# Patient Record
Sex: FEMALE | Race: WHITE | NOT HISPANIC OR LATINO | Employment: OTHER | URBAN - NONMETROPOLITAN AREA
[De-identification: names, ages, dates, MRNs, and addresses within clinical notes are randomized per-mention and may not be internally consistent; named-entity substitution may affect disease eponyms.]

---

## 2017-01-03 ENCOUNTER — OUTSIDE FACILITY SERVICE (OUTPATIENT)
Dept: CARDIOLOGY | Facility: CLINIC | Age: 52
End: 2017-01-03

## 2017-01-03 PROCEDURE — 99222 1ST HOSP IP/OBS MODERATE 55: CPT | Performed by: NURSE PRACTITIONER

## 2017-01-04 ENCOUNTER — OUTSIDE FACILITY SERVICE (OUTPATIENT)
Dept: CARDIOLOGY | Facility: CLINIC | Age: 52
End: 2017-01-04

## 2017-01-04 PROCEDURE — 99231 SBSQ HOSP IP/OBS SF/LOW 25: CPT | Performed by: NURSE PRACTITIONER

## 2017-01-05 ENCOUNTER — OUTSIDE FACILITY SERVICE (OUTPATIENT)
Dept: CARDIOLOGY | Facility: CLINIC | Age: 52
End: 2017-01-05

## 2017-01-05 PROCEDURE — 99231 SBSQ HOSP IP/OBS SF/LOW 25: CPT | Performed by: NURSE PRACTITIONER

## 2017-01-24 ENCOUNTER — OFFICE VISIT (OUTPATIENT)
Dept: CARDIOLOGY | Facility: CLINIC | Age: 52
End: 2017-01-24

## 2017-01-24 VITALS
HEART RATE: 82 BPM | BODY MASS INDEX: 13.59 KG/M2 | OXYGEN SATURATION: 98 % | DIASTOLIC BLOOD PRESSURE: 60 MMHG | SYSTOLIC BLOOD PRESSURE: 100 MMHG | WEIGHT: 72 LBS | HEIGHT: 61 IN

## 2017-01-24 DIAGNOSIS — R00.2 PALPITATIONS: ICD-10-CM

## 2017-01-24 DIAGNOSIS — I25.119 CORONARY ARTERY DISEASE INVOLVING NATIVE CORONARY ARTERY OF NATIVE HEART WITH ANGINA PECTORIS (HCC): ICD-10-CM

## 2017-01-24 DIAGNOSIS — I73.9 CLAUDICATION (HCC): ICD-10-CM

## 2017-01-24 DIAGNOSIS — Z72.0 TOBACCO ABUSE: ICD-10-CM

## 2017-01-24 DIAGNOSIS — R06.02 SHORTNESS OF BREATH: Primary | ICD-10-CM

## 2017-01-24 PROCEDURE — 99214 OFFICE O/P EST MOD 30 MIN: CPT | Performed by: INTERNAL MEDICINE

## 2017-01-24 RX ORDER — ALBUTEROL SULFATE 2.5 MG/3ML
2.5 SOLUTION RESPIRATORY (INHALATION) EVERY 6 HOURS PRN
COMMUNITY
End: 2021-04-28

## 2017-01-24 RX ORDER — FLUCONAZOLE 200 MG/1
200 TABLET ORAL DAILY
COMMUNITY
End: 2018-10-31

## 2017-01-24 RX ORDER — FUROSEMIDE 40 MG/1
40 TABLET ORAL DAILY
Refills: 0 | COMMUNITY
Start: 2017-01-06 | End: 2018-10-31

## 2017-01-24 RX ORDER — NITROGLYCERIN 0.4 MG/1
0.4 TABLET SUBLINGUAL
Qty: 25 TABLET | Refills: 5 | Status: SHIPPED | OUTPATIENT
Start: 2017-01-24 | End: 2018-10-31 | Stop reason: SDUPTHER

## 2017-01-24 NOTE — MR AVS SNAPSHOT
Rin Soler   1/24/2017 2:45 PM   Office Visit    Dept Phone:  [phone]   Encounter #:  [id number]    Provider:  Mike Cunningham MD   Department:  [facility]                Your Full Care Plan              Today's Medication Changes          These changes are accurate as of: 1/24/17  3:45 PM.  If you have any questions, ask your nurse or doctor.               Stop taking medication(s)listed here:     lisinopril 10 MG tablet   Commonly known as:  PRINIVIL,ZESTRIL   Stopped by:  Mike Cunningham MD           lisinopril 40 MG tablet   Commonly known as:  PRINIVIL,ZESTRIL   Stopped by:  Mike Cunningham MD           sucralfate 1 G tablet   Commonly known as:  CARAFATE   Stopped by:  Mike Cunningham MD           ticagrelor 90 MG tablet tablet   Commonly known as:  BRILINTA   Stopped by:  Mike Cunningham MD                Where to Get Your Medications      These medications were sent to [facility] [address] [place] [address] AT [facility] [address] - [phone] [place] [phone] [address] [address]    Hours:  24-hours Phone:  [phone]     nitroglycerin 0.4 MG SL tablet                  Your Updated Medication List          This list is accurate as of: 1/24/17  3:45 PM.  Always use your most recent med list.                albuterol (2.5 MG/3ML) 0.083% nebulizer solution   Commonly known as:  PROVENTIL       aspirin 81 MG EC tablet       atorvastatin 20 MG tablet   Commonly known as:  LIPITOR   Take 1 tablet by mouth daily.       diazePAM 10 MG tablet   Commonly known as:  VALIUM       famotidine 40 MG tablet   Commonly known as:  PEPCID       fluconazole 200 MG tablet   Commonly known as:  DIFLUCAN       furosemide 40 MG tablet   Commonly known as:  LASIX       HYDROcodone-acetaminophen 7.5-325 MG per tablet   Commonly known as:  NORCO       isosorbide mononitrate 30 MG 24 hr tablet   Commonly known as:  IMDUR   Take 1  "tablet by mouth daily.       metoprolol tartrate 25 MG tablet   Commonly known as:  LOPRESSOR       nitroglycerin 0.4 MG SL tablet   Commonly known as:  NITROSTAT   Place 1 tablet under the tongue Every 5 (Five) Minutes As Needed for chest pain. Take no more than 3 doses in 15 minutes.       pantoprazole 40 MG EC tablet   Commonly known as:  PROTONIX               You Were Diagnosed With        Codes Comments    Shortness of breath    -  Primary ICD-10-CM: R06.02  ICD-9-CM: 786.05     Coronary artery disease involving native coronary artery of native heart with angina pectoris     ICD-10-CM: I25.119  ICD-9-CM: 414.01, 413.9     Claudication     ICD-10-CM: I73.9  ICD-9-CM: 443.9     Palpitations     ICD-10-CM: R00.2  ICD-9-CM: 785.1     Tobacco abuse     ICD-10-CM: Z72.0  ICD-9-CM: 305.1       Instructions     None    Patient Instructions History      Upcoming Appointments     Visit Type Date Time Department    FOLLOW UP 1/24/2017  2:45 PM [facility]    FOLLOW UP 7/27/2017  2:00 PM [facility] Signup     Our records indicate that you have declined [employer] signup. If you would like to sign up for [employer], please email [email or web address] or call [phone] to obtain an activation code.             Other Info from Your Visit           Your Appointments     Jul 27, 2017  2:00 PM CDT   Follow Up with Mike Cunningham MD   [facility] (--)    [address] [place] [id number] [phone]           Arrive 15 minutes prior to appointment.              Allergies     Plavix [Clopidogrel Bisulfate]        Reason for Visit     Coronary Artery Disease           Vital Signs     Blood Pressure Pulse Height Weight Oxygen Saturation Body Mass Index    100/60 (BP Location: Left arm, Patient Position: Sitting, Cuff Size: Adult) 82 61\" (154.9 cm) 72 lb (32.7 kg) 98% 13.6 kg/m2    Smoking Status                   Current Every Day " Smoker           Problems and Diagnoses Noted     Coronary artery disease involving native coronary artery of native heart with angina pectoris    Tobacco abuse    Shortness of breath    -  Primary    Claudication        Palpitations

## 2017-01-24 NOTE — LETTER
January 24, 2017     Milton Mcfadden MD  [address]  [address]  [place] [address]    Patient: Rin Soler   YOB: 1965   Date of Visit: 1/24/2017       Dear Dr. Lesa MD:    Rin Soler was in my office today. Below is a copy of my note.    If you have questions, please do not hesitate to call me. I look forward to following Rin along with you.         Sincerely,        Mike Cunningham MD        CC: No Recipients      Reason for Visit: cardiovascular follow up.    HPI:  Rin Soler is a 52 y.o. female is here today for follow-up.  She was last seen in December for evaluation of shortness of breath.  She was hospitalized from 12/31/16 until 1/6/16 with anemia, pneumonia, paraneumonic effusion, multiple electrolyte abnormalities.  Her aspirin, Brilinta, and atorvastatin have been held.  Had endoscopy and esophageal stretching while inpatient.  She has been coughing a lot since discharge.  Shortness of breath remains stable.  Continues to have atypical chest pain.      Previous Cardiac Testing and Procedures:  - Stress echo (08/04/2015) negative for ischemia  - LHC (10/05/2015) 90% mid RCA stenosis status post PCI with 2.25 x 12 mm Xience Alpine IAN, no other coronary artery disease, EF 55% with mild inferior hypokinesis  - Stress echo (03/29/2016) low risk for ischemia, good exercise capacity  - Echo (12/22/2016) EF 60-65%, normal RV size and function, normal diastolic function, all valves structurally and functionally normal  - VESTA (12/22/2016) normal arterial exam of the lower extremities  - Holter monitor (12/22/2016) sinus rhythm with rare atrial ventricular ectopic beats, no significant positives, arrhythmias, or events, symptoms associated with sinus tachycardia    Patient Active Problem List   Diagnosis   • Dysphagia   • GERD (gastroesophageal reflux disease)   • Tobacco abuse   • Coronary artery disease involving native coronary artery of native heart with angina pectoris              Social History   Substance Use Topics   • Smoking status: Current Every Day Smoker     Packs/day: 1.00     Years: 20.00     Types: Cigarettes     Start date: 1976   • Smokeless tobacco: Never Used   • Alcohol use No       Family History   Problem Relation Age of Onset   • Diabetes Mother    • Cancer Father    • Heart disease Father        The following portions of the patient's history were reviewed and updated as appropriate: allergies, current medications, past family history, past medical history, past social history, past surgical history and problem list.      Current Outpatient Prescriptions:   •  albuterol (PROVENTIL) (2.5 MG/3ML) 0.083% nebulizer solution, Take 2.5 mg by nebulization Every 6 (Six) Hours As Needed for wheezing (2 puffs)., Disp: , Rfl:   •  diazePAM (VALIUM) 10 MG tablet, TK 1 T PO TID PRN, Disp: , Rfl: 1  •  fluconazole (DIFLUCAN) 200 MG tablet, Take 200 mg by mouth Daily., Disp: , Rfl:   •  furosemide (LASIX) 40 MG tablet, 40 mg Daily., Disp: , Rfl: 0  •  HYDROcodone-acetaminophen (NORCO) 7.5-325 MG per tablet, TK 1 T PO Q 6 H, Disp: , Rfl: 0  •  isosorbide mononitrate (IMDUR) 30 MG 24 hr tablet, Take 1 tablet by mouth daily., Disp: 30 tablet, Rfl: 11  •  metoprolol tartrate (LOPRESSOR) 25 MG tablet, TK 1 T PO BID, Disp: , Rfl: 11  •  nitroglycerin (NITROSTAT) 0.4 MG SL tablet, Place 1 tablet under the tongue every 5 (five) minutes as needed for chest pain. Take no more than 3 doses in 15 minutes., Disp: 25 tablet, Rfl: 5  •  pantoprazole (PROTONIX) 40 MG EC tablet, TK 1 T PO BID, Disp: , Rfl: 11  •  aspirin 81 MG EC tablet, Take 81 mg by mouth Daily., Disp: , Rfl:   •  atorvastatin (LIPITOR) 20 MG tablet, Take 1 tablet by mouth daily., Disp: 30 tablet, Rfl: 11  •  famotidine (PEPCID) 40 MG tablet, TK 1 T PO QHS, Disp: , Rfl: 11    Review of Systems   Constitution: Positive for weakness and malaise/fatigue. Negative for chills, decreased appetite and fever.   HENT: Positive for  "headaches. Negative for congestion and nosebleeds.    Eyes: Positive for blurred vision. Negative for double vision.   Cardiovascular: Positive for chest pain, dyspnea on exertion, irregular heartbeat and palpitations. Negative for leg swelling, near-syncope and syncope.   Respiratory: Positive for cough, shortness of breath and sleep disturbances due to breathing. Negative for wheezing.    Endocrine: Positive for cold intolerance and heat intolerance.   Hematologic/Lymphatic: Negative for bleeding problem. Does not bruise/bleed easily.   Skin: Positive for itching and rash.   Musculoskeletal: Positive for arthritis, back pain, falls, joint pain, joint swelling, muscle cramps, muscle weakness, neck pain and stiffness.   Gastrointestinal: Positive for nausea and vomiting. Negative for abdominal pain, constipation, diarrhea, heartburn and melena.   Genitourinary: Negative for dysuria, frequency and hematuria.   Neurological: Positive for tremors. Negative for dizziness, light-headedness, loss of balance and numbness.   Psychiatric/Behavioral: Positive for depression. Negative for hallucinations. The patient has insomnia and is nervous/anxious.    Allergic/Immunologic: Positive for persistent infections.       Objective   Visit Vitals   • /60 (BP Location: Left arm, Patient Position: Sitting, Cuff Size: Adult)   • Pulse 82   • Ht 61\" (154.9 cm)   • Wt 72 lb (32.7 kg)   • SpO2 98%   • BMI 13.6 kg/m2     Physical Exam   Constitutional: She is oriented to person, place, and time. She appears cachectic.   HENT:   Head: Normocephalic and atraumatic.   Cardiovascular: Normal rate, regular rhythm and normal heart sounds.    No murmur heard.  Pulmonary/Chest: Effort normal and breath sounds normal.   Musculoskeletal: She exhibits no edema.   Neurological: She is alert and oriented to person, place, and time.   Skin: Skin is warm and dry.   Psychiatric: She has a normal mood and affect.     Procedures      ICD-10-CM " ICD-9-CM   1. Shortness of breath R06.02 786.05   2. Coronary artery disease involving native coronary artery of native heart with angina pectoris I25.119 414.01     413.9   3. Claudication I73.9 443.9   4. Palpitations R00.2 785.1   5. Tobacco abuse Z72.0 305.1         Assessment/Plan:  1.  Shortness of breath: Normal echo and stress echo.  No evidence of cardiac etiology to symptoms.  Possibly due to anemia, recent pneumonia, COPD, malnutrition, and deconditioning.    2.  CAD: Status post PCI to RCA October 2015.  Negative stress echo March 2016.  Continue current medical therapy with aspirin, Brilinta, metoprolol, and atorvastatin.  Brilinta, aspirin, and atorvastatin has been held since discharge.  Brilinta can be stopped since it is greater than 1 year since stent.  She should resume her aspirin and atorvastatin.      3.  Claudication: Normal VESTA exam.  Possibly musculoskeletal etiology.    4.  Palpitations: Symptoms associated with sinus tachycardia, otherwise unremarkable Holter monitor.  No evidence of dysrhythmia.    5.  Tobacco abuse: smokes 1 PPD,  on cessation.

## 2017-01-24 NOTE — PROGRESS NOTES
Reason for Visit: cardiovascular follow up.    HPI:  Rin Soler is a 52 y.o. female is here today for follow-up.  She was last seen in December for evaluation of shortness of breath.  She was hospitalized from 12/31/16 until 1/6/16 with anemia, pneumonia, paraneumonic effusion, multiple electrolyte abnormalities.  Her aspirin, Brilinta, and atorvastatin have been held.  Had endoscopy and esophageal stretching while inpatient.  She has been coughing a lot since discharge.  Shortness of breath remains stable.  Continues to have atypical chest pain.      Previous Cardiac Testing and Procedures:  - Stress echo (08/04/2015) negative for ischemia  - LHC (10/05/2015) 90% mid RCA stenosis status post PCI with 2.25 x 12 mm Xience Alpine IAN, no other coronary artery disease, EF 55% with mild inferior hypokinesis  - Stress echo (03/29/2016) low risk for ischemia, good exercise capacity  - Echo (12/22/2016) EF 60-65%, normal RV size and function, normal diastolic function, all valves structurally and functionally normal  - VESTA (12/22/2016) normal arterial exam of the lower extremities  - Holter monitor (12/22/2016) sinus rhythm with rare atrial ventricular ectopic beats, no significant positives, arrhythmias, or events, symptoms associated with sinus tachycardia    Patient Active Problem List   Diagnosis   • Dysphagia   • GERD (gastroesophageal reflux disease)   • Tobacco abuse   • Coronary artery disease involving native coronary artery of native heart with angina pectoris       Social History   Substance Use Topics   • Smoking status: Current Every Day Smoker     Packs/day: 1.00     Years: 20.00     Types: Cigarettes     Start date: 1976   • Smokeless tobacco: Never Used   • Alcohol use No       Family History   Problem Relation Age of Onset   • Diabetes Mother    • Cancer Father    • Heart disease Father        The following portions of the patient's history were reviewed and updated as appropriate: allergies, current  medications, past family history, past medical history, past social history, past surgical history and problem list.      Current Outpatient Prescriptions:   •  albuterol (PROVENTIL) (2.5 MG/3ML) 0.083% nebulizer solution, Take 2.5 mg by nebulization Every 6 (Six) Hours As Needed for wheezing (2 puffs)., Disp: , Rfl:   •  diazePAM (VALIUM) 10 MG tablet, TK 1 T PO TID PRN, Disp: , Rfl: 1  •  fluconazole (DIFLUCAN) 200 MG tablet, Take 200 mg by mouth Daily., Disp: , Rfl:   •  furosemide (LASIX) 40 MG tablet, 40 mg Daily., Disp: , Rfl: 0  •  HYDROcodone-acetaminophen (NORCO) 7.5-325 MG per tablet, TK 1 T PO Q 6 H, Disp: , Rfl: 0  •  isosorbide mononitrate (IMDUR) 30 MG 24 hr tablet, Take 1 tablet by mouth daily., Disp: 30 tablet, Rfl: 11  •  metoprolol tartrate (LOPRESSOR) 25 MG tablet, TK 1 T PO BID, Disp: , Rfl: 11  •  nitroglycerin (NITROSTAT) 0.4 MG SL tablet, Place 1 tablet under the tongue every 5 (five) minutes as needed for chest pain. Take no more than 3 doses in 15 minutes., Disp: 25 tablet, Rfl: 5  •  pantoprazole (PROTONIX) 40 MG EC tablet, TK 1 T PO BID, Disp: , Rfl: 11  •  aspirin 81 MG EC tablet, Take 81 mg by mouth Daily., Disp: , Rfl:   •  atorvastatin (LIPITOR) 20 MG tablet, Take 1 tablet by mouth daily., Disp: 30 tablet, Rfl: 11  •  famotidine (PEPCID) 40 MG tablet, TK 1 T PO QHS, Disp: , Rfl: 11    Review of Systems   Constitution: Positive for weakness and malaise/fatigue. Negative for chills, decreased appetite and fever.   HENT: Positive for headaches. Negative for congestion and nosebleeds.    Eyes: Positive for blurred vision. Negative for double vision.   Cardiovascular: Positive for chest pain, dyspnea on exertion, irregular heartbeat and palpitations. Negative for leg swelling, near-syncope and syncope.   Respiratory: Positive for cough, shortness of breath and sleep disturbances due to breathing. Negative for wheezing.    Endocrine: Positive for cold intolerance and heat intolerance.  "  Hematologic/Lymphatic: Negative for bleeding problem. Does not bruise/bleed easily.   Skin: Positive for itching and rash.   Musculoskeletal: Positive for arthritis, back pain, falls, joint pain, joint swelling, muscle cramps, muscle weakness, neck pain and stiffness.   Gastrointestinal: Positive for nausea and vomiting. Negative for abdominal pain, constipation, diarrhea, heartburn and melena.   Genitourinary: Negative for dysuria, frequency and hematuria.   Neurological: Positive for tremors. Negative for dizziness, light-headedness, loss of balance and numbness.   Psychiatric/Behavioral: Positive for depression. Negative for hallucinations. The patient has insomnia and is nervous/anxious.    Allergic/Immunologic: Positive for persistent infections.       Objective   Visit Vitals   • /60 (BP Location: Left arm, Patient Position: Sitting, Cuff Size: Adult)   • Pulse 82   • Ht 61\" (154.9 cm)   • Wt 72 lb (32.7 kg)   • SpO2 98%   • BMI 13.6 kg/m2     Physical Exam   Constitutional: She is oriented to person, place, and time. She appears cachectic.   HENT:   Head: Normocephalic and atraumatic.   Cardiovascular: Normal rate, regular rhythm and normal heart sounds.    No murmur heard.  Pulmonary/Chest: Effort normal and breath sounds normal.   Musculoskeletal: She exhibits no edema.   Neurological: She is alert and oriented to person, place, and time.   Skin: Skin is warm and dry.   Psychiatric: She has a normal mood and affect.     Procedures      ICD-10-CM ICD-9-CM   1. Shortness of breath R06.02 786.05   2. Coronary artery disease involving native coronary artery of native heart with angina pectoris I25.119 414.01     413.9   3. Claudication I73.9 443.9   4. Palpitations R00.2 785.1   5. Tobacco abuse Z72.0 305.1         Assessment/Plan:  1.  Shortness of breath: Normal echo and stress echo.  No evidence of cardiac etiology to symptoms.  Possibly due to anemia, recent pneumonia, COPD, malnutrition, and " deconditioning.    2.  CAD: Status post PCI to RCA October 2015.  Negative stress echo March 2016.  Continue current medical therapy with aspirin, Brilinta, metoprolol, and atorvastatin.  Brilinta, aspirin, and atorvastatin has been held since discharge.  Brilinta can be stopped since it is greater than 1 year since stent.  She should resume her aspirin and atorvastatin.      3.  Claudication: Normal VESTA exam.  Possibly musculoskeletal etiology.    4.  Palpitations: Symptoms associated with sinus tachycardia, otherwise unremarkable Holter monitor.  No evidence of dysrhythmia.    5.  Tobacco abuse: smokes 1 PPD,  on cessation.

## 2017-04-19 ENCOUNTER — TELEPHONE (OUTPATIENT)
Dept: CARDIOLOGY | Facility: CLINIC | Age: 52
End: 2017-04-19

## 2017-04-19 NOTE — TELEPHONE ENCOUNTER
She is scheduled to have colonoscopy with Dr. Buckley in [place] next week. They want to know if she needs to stop asa and for how long?

## 2017-09-25 RX ORDER — ISOSORBIDE MONONITRATE 30 MG/1
TABLET, EXTENDED RELEASE ORAL
Qty: 30 TABLET | Refills: 11 | Status: SHIPPED | OUTPATIENT
Start: 2017-09-25 | End: 2018-09-20 | Stop reason: SDUPTHER

## 2017-09-25 RX ORDER — ATORVASTATIN CALCIUM 20 MG/1
TABLET, FILM COATED ORAL
Qty: 30 TABLET | Refills: 11 | Status: SHIPPED | OUTPATIENT
Start: 2017-09-25 | End: 2018-09-20 | Stop reason: SDUPTHER

## 2018-09-20 RX ORDER — ISOSORBIDE MONONITRATE 30 MG/1
30 TABLET, EXTENDED RELEASE ORAL DAILY
Qty: 30 TABLET | Refills: 0 | Status: SHIPPED | OUTPATIENT
Start: 2018-09-20 | End: 2018-10-18 | Stop reason: SDUPTHER

## 2018-09-20 RX ORDER — ATORVASTATIN CALCIUM 20 MG/1
20 TABLET, FILM COATED ORAL DAILY
Qty: 30 TABLET | Refills: 0 | Status: SHIPPED | OUTPATIENT
Start: 2018-09-20 | End: 2018-10-18 | Stop reason: SDUPTHER

## 2018-10-18 RX ORDER — ISOSORBIDE MONONITRATE 30 MG/1
30 TABLET, EXTENDED RELEASE ORAL DAILY
Qty: 30 TABLET | Refills: 0 | Status: SHIPPED | OUTPATIENT
Start: 2018-10-18 | End: 2018-10-31 | Stop reason: SDUPTHER

## 2018-10-18 RX ORDER — ATORVASTATIN CALCIUM 20 MG/1
20 TABLET, FILM COATED ORAL DAILY
Qty: 30 TABLET | Refills: 0 | Status: SHIPPED | OUTPATIENT
Start: 2018-10-18 | End: 2018-10-31 | Stop reason: SDUPTHER

## 2018-10-18 NOTE — TELEPHONE ENCOUNTER
Refill request for:  Atorvastatin 20 mg PO daily, Quantity: 30, Refills: 0.  Imdur 30 mg PO daily, Quantity: 30, Refills: 0.   HL

## 2018-10-31 ENCOUNTER — OFFICE VISIT (OUTPATIENT)
Dept: CARDIOLOGY | Facility: CLINIC | Age: 53
End: 2018-10-31

## 2018-10-31 VITALS
SYSTOLIC BLOOD PRESSURE: 162 MMHG | WEIGHT: 68 LBS | DIASTOLIC BLOOD PRESSURE: 90 MMHG | OXYGEN SATURATION: 99 % | BODY MASS INDEX: 12.84 KG/M2 | HEART RATE: 59 BPM | HEIGHT: 61 IN

## 2018-10-31 DIAGNOSIS — E78.5 DYSLIPIDEMIA: ICD-10-CM

## 2018-10-31 DIAGNOSIS — Z72.0 TOBACCO ABUSE: ICD-10-CM

## 2018-10-31 DIAGNOSIS — I25.119 CORONARY ARTERY DISEASE INVOLVING NATIVE CORONARY ARTERY OF NATIVE HEART WITH ANGINA PECTORIS (HCC): Primary | ICD-10-CM

## 2018-10-31 DIAGNOSIS — R07.9 CHEST PAIN, UNSPECIFIED TYPE: ICD-10-CM

## 2018-10-31 PROCEDURE — 93000 ELECTROCARDIOGRAM COMPLETE: CPT | Performed by: INTERNAL MEDICINE

## 2018-10-31 PROCEDURE — 99406 BEHAV CHNG SMOKING 3-10 MIN: CPT | Performed by: INTERNAL MEDICINE

## 2018-10-31 PROCEDURE — 99214 OFFICE O/P EST MOD 30 MIN: CPT | Performed by: INTERNAL MEDICINE

## 2018-10-31 RX ORDER — ATORVASTATIN CALCIUM 20 MG/1
20 TABLET, FILM COATED ORAL DAILY
Qty: 30 TABLET | Refills: 0 | Status: SHIPPED | OUTPATIENT
Start: 2018-10-31 | End: 2018-11-14 | Stop reason: SDUPTHER

## 2018-10-31 RX ORDER — NITROGLYCERIN 0.4 MG/1
0.4 TABLET SUBLINGUAL
Qty: 25 TABLET | Refills: 5 | Status: SHIPPED | OUTPATIENT
Start: 2018-10-31

## 2018-10-31 RX ORDER — NICOTINE 21 MG/24HR
1 PATCH, TRANSDERMAL 24 HOURS TRANSDERMAL EVERY 24 HOURS
Qty: 30 PATCH | Refills: 11 | Status: SHIPPED | OUTPATIENT
Start: 2018-10-31 | End: 2020-06-10

## 2018-10-31 RX ORDER — ISOSORBIDE MONONITRATE 60 MG/1
60 TABLET, EXTENDED RELEASE ORAL DAILY
Qty: 30 TABLET | Refills: 11 | Status: SHIPPED | OUTPATIENT
Start: 2018-10-31 | End: 2018-11-27 | Stop reason: SDUPTHER

## 2018-10-31 NOTE — PROGRESS NOTES
Reason for Visit: cardiovascular follow up.    HPI:  Rin Soler is a 53 y.o. female is here today for 1 year follow-up.  She has been having worsening chest pain over the past couple weeks.  It happens at both rest and stress.  Described as a substernal chest pressure sometimes radiating into her arms.  Usually improves with rest and relaxation.  She has not taken any nitroglycerin because she is concerned about possible side effects, does not like taking medications, and her bottle has  at home.  She continues to smoke 1 PPD.  She uses this as a stress relief.  Her blood pressure has been running high recently.      Previous Cardiac Testing and Procedures:  - Stress echo (2015) negative for ischemia  - Lipid panel (2015) 166/47/93/130  - LHC (10/05/2015) 90% mid RCA stenosis status post PCI with 2.25 x 12 mm Xience Alpine IAN, no other coronary artery disease, EF 55% with mild inferior hypokinesis  - Stress echo (2016) low risk for ischemia, good exercise capacity  - Echo (2016) EF 60-65%, normal RV size and function, normal diastolic function, all valves structurally and functionally normal  - VESTA (2016) normal arterial exam of the lower extremities  - Holter monitor (2016) sinus rhythm with rare atrial ventricular ectopic beats, no significant pauses, arrhythmias, or events, symptoms associated with sinus tachycardia    Patient Active Problem List   Diagnosis   • Dysphagia   • GERD (gastroesophageal reflux disease)   • Tobacco abuse   • Coronary artery disease involving native coronary artery of native heart with angina pectoris (CMS/[facility])       Social History   Substance Use Topics   • Smoking status: Current Every Day Smoker     Packs/day: 1.00     Years: 20.00     Types: Cigarettes     Start date:    • Smokeless tobacco: Never Used   • Alcohol use No       Family History   Problem Relation Age of Onset   • Diabetes Mother    • Cancer Father    • Heart disease  Father        The following portions of the patient's history were reviewed and updated as appropriate: allergies, current medications, past family history, past medical history, past social history, past surgical history and problem list.      Current Outpatient Prescriptions:   •  albuterol (PROVENTIL) (2.5 MG/3ML) 0.083% nebulizer solution, Take 2.5 mg by nebulization Every 6 (Six) Hours As Needed for wheezing (2 puffs)., Disp: , Rfl:   •  aspirin 81 MG EC tablet, Take 81 mg by mouth Daily., Disp: , Rfl:   •  atorvastatin (LIPITOR) 20 MG tablet, Take 1 tablet by mouth Daily., Disp: 30 tablet, Rfl: 0  •  diazePAM (VALIUM) 10 MG tablet, TK 1 T PO TID PRN, Disp: , Rfl: 1  •  HYDROcodone-acetaminophen (NORCO) 7.5-325 MG per tablet, TK 1 T PO Q 6 H, Disp: , Rfl: 0  •  isosorbide mononitrate (IMDUR) 60 MG 24 hr tablet, Take 1 tablet by mouth Daily., Disp: 30 tablet, Rfl: 11  •  metoprolol tartrate (LOPRESSOR) 25 MG tablet, Take 1 tablet by mouth 2 (Two) Times a Day., Disp: 60 tablet, Rfl: 11  •  pantoprazole (PROTONIX) 40 MG EC tablet, TK 1 T PO BID, Disp: , Rfl: 11  •  nicotine (NICODERM CQ) 21 MG/24HR patch, Place 1 patch on the skin as directed by provider Daily., Disp: 30 patch, Rfl: 11  •  nicotine polacrilex (NICORETTE) 4 MG gum, Chew 1 each As Needed for Smoking Cessation., Disp: 150 each, Rfl: 11  •  nitroglycerin (NITROSTAT) 0.4 MG SL tablet, Place 1 tablet under the tongue Every 5 (Five) Minutes As Needed for Chest Pain. Take no more than 3 doses in 15 minutes., Disp: 25 tablet, Rfl: 5    Review of Systems   Constitution: Negative for chills and fever.   Cardiovascular: Positive for chest pain. Negative for paroxysmal nocturnal dyspnea.   Respiratory: Positive for shortness of breath. Negative for cough.    Skin: Negative for rash.   Gastrointestinal: Negative for abdominal pain and heartburn.   Neurological: Negative for dizziness and numbness.   Psychiatric/Behavioral: The patient is nervous/anxious.   "      Objective   /90 (BP Location: Left arm, Patient Position: Sitting, Cuff Size: Adult)   Pulse 59   Ht 154.9 cm (60.98\")   Wt 30.8 kg (68 lb)   SpO2 99%   BMI 12.86 kg/m²   Physical Exam   Constitutional: She is oriented to person, place, and time. She appears cachectic.   HENT:   Head: Normocephalic and atraumatic.   Cardiovascular: Normal rate, regular rhythm and normal heart sounds.    No murmur heard.  Pulmonary/Chest: Effort normal and breath sounds normal.   Abdominal: She exhibits no distension.   Musculoskeletal: She exhibits no edema.   Neurological: She is alert and oriented to person, place, and time.   Skin: Skin is warm and dry.   Psychiatric: She has a normal mood and affect.       ECG 12 Lead  Date/Time: 10/31/2018 3:08 PM  Performed by: BROOKLYN BUENROSTRO  Authorized by: BROOKLYN BUENROSTRO   Comparison: compared with previous ECG from 12/7/2016  Similar to previous ECG  Comparison to previous ECG: Significant changes have occurred  Rhythm: sinus rhythm  Rate: bradycardic  T depression: V6, V5, V4, V3, III, aVF and II  Other findings: LVH              ICD-10-CM ICD-9-CM   1. Coronary artery disease involving native coronary artery of native heart with angina pectoris (CMS/[facility]) I25.119 414.01     413.9   2. Chest pain, unspecified type R07.9 786.50   3. Tobacco abuse Z72.0 305.1   4. Dyslipidemia E78.5 272.4         Assessment/Plan:  1. CAD: Status post PCI to RCA 10/2015.  Negative stress echo March 2016.  Now with worsening chest pain and new EKG changes.  Will evaluate further with a nuclear stress.  Titrate up Imdur to 60 mg, renew nitroglycerin.  Continue other medical therapy with aspirin, Brilinta, metoprolol, and atorvastatin.  Aspirin and atorvastatin has been held since discharge.      2.  Chest pain: Concerning for progression of her CAD.  Nuclear stress ordered.     3.  Tobacco abuse:  on smoking cessation.  I advised Rin of the risks of continuing to use tobacco, and I " provided her with tobacco cessation educational materials in the After Visit Summary.  During this visit, I spent 10 minutes counseling the patient regarding tobacco cessation.     4.  Dyslipidemia: Currently managed on atorvastatin.  Will obtain copy of last lipid panel from PCP.

## 2018-11-07 ENCOUNTER — TELEPHONE (OUTPATIENT)
Dept: CARDIOLOGY | Facility: CLINIC | Age: 53
End: 2018-11-07

## 2018-11-07 NOTE — TELEPHONE ENCOUNTER
----- Message from Mike Cunningham MD sent at 10/31/2018  3:41 PM CDT -----  Please obtain copy of last lipid panel from PCP

## 2018-11-14 DIAGNOSIS — E78.5 DYSLIPIDEMIA: ICD-10-CM

## 2018-11-14 RX ORDER — ATORVASTATIN CALCIUM 20 MG/1
20 TABLET, FILM COATED ORAL DAILY
Qty: 30 TABLET | Refills: 11 | Status: SHIPPED | OUTPATIENT
Start: 2018-11-14 | End: 2019-12-19 | Stop reason: SDUPTHER

## 2018-11-20 RX ORDER — ISOSORBIDE MONONITRATE 30 MG/1
TABLET, EXTENDED RELEASE ORAL
Qty: 30 TABLET | Refills: 0 | OUTPATIENT
Start: 2018-11-20

## 2018-11-26 ENCOUNTER — TELEPHONE (OUTPATIENT)
Dept: CARDIOLOGY | Facility: CLINIC | Age: 53
End: 2018-11-26

## 2018-11-27 DIAGNOSIS — I25.119 CORONARY ARTERY DISEASE INVOLVING NATIVE CORONARY ARTERY OF NATIVE HEART WITH ANGINA PECTORIS (HCC): ICD-10-CM

## 2018-11-27 RX ORDER — ISOSORBIDE MONONITRATE 30 MG/1
30 TABLET, EXTENDED RELEASE ORAL DAILY
Qty: 30 TABLET | Refills: 11 | Status: SHIPPED | OUTPATIENT
Start: 2018-11-27 | End: 2019-12-19 | Stop reason: SDUPTHER

## 2019-02-08 ENCOUNTER — TELEPHONE (OUTPATIENT)
Dept: CARDIOLOGY | Facility: CLINIC | Age: 54
End: 2019-02-08

## 2019-02-08 DIAGNOSIS — R07.9 CHEST PAIN, UNSPECIFIED TYPE: Primary | ICD-10-CM

## 2019-02-08 NOTE — TELEPHONE ENCOUNTER
Pt called requesting stress test be rescheduled that was ordered in Oct 2018.  She does not want IV medication. Can order be changed from nuclear to treadmill?

## 2019-02-25 NOTE — TELEPHONE ENCOUNTER
Pt called back bc she said she had not heard anything about a stress test scheduled - I notified her that I left the appt on her  bc she did not answer when I called to notify her.  I told her that we asked Dr. Cunningham about changing the nuclear stress order and that he did not see fit to changing the order.  She wasn't happy about it but said if that's what she has to do then she will do it.  Notified pt of all prep and where to go for the test again, pt understood.

## 2019-03-13 ENCOUNTER — OUTSIDE FACILITY SERVICE (OUTPATIENT)
Dept: CARDIOLOGY | Facility: CLINIC | Age: 54
End: 2019-03-13

## 2019-03-13 PROCEDURE — 93018 CV STRESS TEST I&R ONLY: CPT | Performed by: INTERNAL MEDICINE

## 2019-03-13 PROCEDURE — 93016 CV STRESS TEST SUPVJ ONLY: CPT | Performed by: INTERNAL MEDICINE

## 2019-03-13 PROCEDURE — 78452 HT MUSCLE IMAGE SPECT MULT: CPT | Performed by: INTERNAL MEDICINE

## 2019-04-18 ENCOUNTER — OFFICE VISIT (OUTPATIENT)
Dept: CARDIOLOGY | Facility: CLINIC | Age: 54
End: 2019-04-18

## 2019-04-18 VITALS
HEART RATE: 62 BPM | HEIGHT: 61 IN | SYSTOLIC BLOOD PRESSURE: 110 MMHG | BODY MASS INDEX: 12.84 KG/M2 | DIASTOLIC BLOOD PRESSURE: 80 MMHG | OXYGEN SATURATION: 98 % | WEIGHT: 68 LBS

## 2019-04-18 DIAGNOSIS — E78.5 DYSLIPIDEMIA: ICD-10-CM

## 2019-04-18 DIAGNOSIS — R07.89 OTHER CHEST PAIN: ICD-10-CM

## 2019-04-18 DIAGNOSIS — Z72.0 TOBACCO ABUSE: ICD-10-CM

## 2019-04-18 DIAGNOSIS — Z01.818 PREOPERATIVE EVALUATION TO RULE OUT SURGICAL CONTRAINDICATION: ICD-10-CM

## 2019-04-18 DIAGNOSIS — I25.119 CORONARY ARTERY DISEASE INVOLVING NATIVE CORONARY ARTERY OF NATIVE HEART WITH ANGINA PECTORIS (HCC): Primary | ICD-10-CM

## 2019-04-18 PROCEDURE — 99214 OFFICE O/P EST MOD 30 MIN: CPT | Performed by: INTERNAL MEDICINE

## 2019-04-18 PROCEDURE — 99406 BEHAV CHNG SMOKING 3-10 MIN: CPT | Performed by: INTERNAL MEDICINE

## 2019-04-18 PROCEDURE — 93000 ELECTROCARDIOGRAM COMPLETE: CPT | Performed by: INTERNAL MEDICINE

## 2019-04-18 NOTE — PROGRESS NOTES
"  Reason for Visit: cardiovascular follow up.    HPI:  Rin Soler is a 54 y.o. female is here today for follow-up.  At last visit she was having worsening chest pain.  She had a nuclear stress test that was negative for ischemia.  The chest pain happens randomly and \"comes out of no where\".  The pain will radiate down her arms and she will break out into a sweat. Symptoms seem to occur more frequently when she is under stress.  Has not had an episode in over a month.  She does report a history of panic attacks.  She also reports problems with her esophagus and has to get this stretched periodically.  Food will occasionally get stuck when swallowing.  She is going to be having breast surgery to fix one of her previous implants.  She continues to smoke heavily about a pack and half a day.  She has been reluctant to start the nicotine patches or gum due to concern about putting further chemicals in her body.    Previous Cardiac Testing and Procedures:  - Stress echo (08/04/2015) negative for ischemia  - Lipid panel (09/03/2015) 166/47/93/130  - C (10/05/2015) 90% mid RCA stenosis status post PCI with 2.25 x 12 mm Xience Alpine IAN, no other coronary artery disease, EF 55% with mild inferior hypokinesis  - Stress echo (03/29/2016) low risk for ischemia, good exercise capacity  - Echo (12/22/2016) EF 60-65%, normal RV size and function, normal diastolic function, all valves structurally and functionally normal  - VESTA (12/22/2016) normal arterial exam of the lower extremities  - Holter monitor (12/22/2016) sinus rhythm with rare atrial ventricular ectopic beats, no significant pauses, arrhythmias, or events, symptoms associated with sinus tachycardia  - Lipid panel (10/3/2017) total cholesterol 106, HDL 53, LDL 31, triglycerides 110  - Exercise nuclear stress (3/13/2019) negative for ischemia, EF 78%    Patient Active Problem List   Diagnosis   • Dysphagia   • GERD (gastroesophageal reflux disease)   • Tobacco abuse "   • Coronary artery disease involving native coronary artery of native heart with angina pectoris (CMS/HCC)   • Dyslipidemia       Social History     Tobacco Use   • Smoking status: Current Every Day Smoker     Packs/day: 1.50     Years: 20.00     Pack years: 30.00     Types: Cigarettes     Start date: 1976   • Smokeless tobacco: Never Used   Substance Use Topics   • Alcohol use: No   • Drug use: No       Family History   Problem Relation Age of Onset   • Diabetes Mother    • Cancer Father    • Heart disease Father        The following portions of the patient's history were reviewed and updated as appropriate: allergies, current medications, past family history, past medical history, past social history, past surgical history and problem list.      Current Outpatient Medications:   •  albuterol (PROVENTIL) (2.5 MG/3ML) 0.083% nebulizer solution, Take 2.5 mg by nebulization Every 6 (Six) Hours As Needed for wheezing (2 puffs)., Disp: , Rfl:   •  aspirin 81 MG EC tablet, Take 81 mg by mouth Daily., Disp: , Rfl:   •  atorvastatin (LIPITOR) 20 MG tablet, TAKE 1 TABLET BY MOUTH DAILY, Disp: 30 tablet, Rfl: 11  •  diazePAM (VALIUM) 10 MG tablet, TK 1 T PO TID PRN, Disp: , Rfl: 1  •  HYDROcodone-acetaminophen (NORCO) 7.5-325 MG per tablet, TK 1 T PO Q 6 H, Disp: , Rfl: 0  •  isosorbide mononitrate (IMDUR) 30 MG 24 hr tablet, Take 1 tablet by mouth Daily., Disp: 30 tablet, Rfl: 11  •  metoprolol tartrate (LOPRESSOR) 25 MG tablet, Take 1 tablet by mouth 2 (Two) Times a Day., Disp: 60 tablet, Rfl: 11  •  pantoprazole (PROTONIX) 40 MG EC tablet, TK 1 T PO BID, Disp: , Rfl: 11  •  nicotine (NICODERM CQ) 21 MG/24HR patch, Place 1 patch on the skin as directed by provider Daily., Disp: 30 patch, Rfl: 11  •  nicotine polacrilex (NICORETTE) 4 MG gum, Chew 1 each As Needed for Smoking Cessation., Disp: 150 each, Rfl: 11  •  nitroglycerin (NITROSTAT) 0.4 MG SL tablet, Place 1 tablet under the tongue Every 5 (Five) Minutes As Needed  "for Chest Pain. Take no more than 3 doses in 15 minutes., Disp: 25 tablet, Rfl: 5    Review of Systems   Constitution: Negative for chills and fever.   Cardiovascular: Positive for chest pain. Negative for paroxysmal nocturnal dyspnea.   Respiratory: Negative for cough and shortness of breath.    Skin: Negative for rash.   Musculoskeletal: Positive for joint pain.   Gastrointestinal: Positive for dysphagia and heartburn. Negative for abdominal pain.   Neurological: Negative for dizziness and numbness.       Objective   /80 (BP Location: Left arm, Patient Position: Sitting, Cuff Size: Adult)   Pulse 62   Ht 154.9 cm (60.98\")   Wt 30.8 kg (68 lb)   SpO2 98%   BMI 12.86 kg/m²   Physical Exam   Constitutional: She is oriented to person, place, and time. She appears well-developed and well-nourished.   HENT:   Head: Normocephalic and atraumatic.   Cardiovascular: Normal rate, regular rhythm and normal heart sounds.   No murmur heard.  Pulmonary/Chest: Effort normal and breath sounds normal.   Abdominal: She exhibits no distension.   Musculoskeletal: She exhibits no edema.   Neurological: She is alert and oriented to person, place, and time.   Skin: Skin is warm and dry.   Psychiatric: She has a normal mood and affect.       ECG 12 Lead  Date/Time: 4/18/2019 3:13 PM  Performed by: Mike Cunningham MD  Authorized by: Mike Cunningham MD   Comparison: compared with previous ECG from 10/31/2018  Comparison to previous ECG: T wave inversions have improved  Rhythm: sinus rhythm  Rate: normal    Clinical impression: normal ECG              ICD-10-CM ICD-9-CM   1. Coronary artery disease involving native coronary artery of native heart with angina pectoris (CMS/[facility]) I25.119 414.01     413.9   2. Other chest pain R07.89 786.59   3. Tobacco abuse Z72.0 305.1   4. Dyslipidemia E78.5 272.4   5. Preoperative evaluation to rule out surgical contraindication Z01.818 V72.83         Assessment/Plan:  1.  CAD: Status post PCI " to RCA 10/2015.  Negative stress echo 3/2016 and negative nuclear stress 3/2019.    Continue aspirin, atorvastatin, Imdur, metoprolol, and SL nitro.       2.  Chest pain: Has both some typical and atypical characteristics.  Normal myocardial perfusion and no evidence of ischemia on recent nuclear SPECT.  Panic attacks, anxiety, GERD, esophageal spasm, and musculoskeletal or other items in the differential.  She is due to get her esophagus stretched and is also having dysphagia.  Recommend follow-up with GI.      3.  Tobacco abuse: Encouraged her to consider starting the nicotine replacement therapy that she has at home. I advised Rin of the risks of continuing to use tobacco, and I provided her with tobacco cessation educational materials in the After Visit Summary.  During this visit, I spent 7 minutes counseling the patient regarding tobacco cessation.      4.  Dyslipidemia: Well controlled on atorvastatin.      5.  Preoperative evaluation: Patient is a low risk surgical candidate from a cardiac standpoint.  She had a recent negative nuclear stress test.  No further cardiac testing is indicated prior to surgery.  She should continue all medications throughout the perioperative period including aspirin.

## 2019-12-19 ENCOUNTER — OFFICE VISIT (OUTPATIENT)
Dept: CARDIOLOGY | Facility: CLINIC | Age: 54
End: 2019-12-19

## 2019-12-19 VITALS
BODY MASS INDEX: 13.22 KG/M2 | OXYGEN SATURATION: 97 % | HEIGHT: 61 IN | HEART RATE: 89 BPM | SYSTOLIC BLOOD PRESSURE: 162 MMHG | WEIGHT: 70 LBS | DIASTOLIC BLOOD PRESSURE: 100 MMHG

## 2019-12-19 DIAGNOSIS — E78.5 DYSLIPIDEMIA: ICD-10-CM

## 2019-12-19 DIAGNOSIS — I10 ESSENTIAL HYPERTENSION: ICD-10-CM

## 2019-12-19 DIAGNOSIS — I25.119 CORONARY ARTERY DISEASE INVOLVING NATIVE CORONARY ARTERY OF NATIVE HEART WITH ANGINA PECTORIS (HCC): Primary | ICD-10-CM

## 2019-12-19 DIAGNOSIS — R91.1 LUNG NODULE: ICD-10-CM

## 2019-12-19 DIAGNOSIS — Z72.0 TOBACCO ABUSE: ICD-10-CM

## 2019-12-19 PROCEDURE — 99406 BEHAV CHNG SMOKING 3-10 MIN: CPT | Performed by: INTERNAL MEDICINE

## 2019-12-19 PROCEDURE — 99214 OFFICE O/P EST MOD 30 MIN: CPT | Performed by: INTERNAL MEDICINE

## 2019-12-19 RX ORDER — ATORVASTATIN CALCIUM 20 MG/1
20 TABLET, FILM COATED ORAL DAILY
Qty: 30 TABLET | Refills: 11 | Status: SHIPPED | OUTPATIENT
Start: 2019-12-19

## 2019-12-19 RX ORDER — ISOSORBIDE MONONITRATE 30 MG/1
30 TABLET, EXTENDED RELEASE ORAL DAILY
Qty: 30 TABLET | Refills: 11 | Status: SHIPPED | OUTPATIENT
Start: 2019-12-19 | End: 2021-09-10 | Stop reason: SDUPTHER

## 2019-12-19 NOTE — PROGRESS NOTES
Reason for Visit: cardiovascular follow up.    HPI:  Rin Soler is a 54 y.o. female is here today for follow-up.  She has still not been able to get her defaulted breast implant fixed as insurance will not pay for it.  She reports having a CT scan done that showed a spot on her lung that is being worked up by Dr Newberry.  She is very frustrated because the PET scan and other tests are not getting improved.  She has not had any recent cardiac issues.  She denies any chest pain, palpitations, dizziness, syncope, PND, or orthopnea.  Her blood pressure has been running high at home.  She continues to smoke at about 1.5 PPD.      Previous Cardiac Testing and Procedures:  - Stress echo (08/04/2015) negative for ischemia  - Lipid panel (09/03/2015) 166/47/93/130  - LHC (10/05/2015) 90% mid RCA stenosis status post PCI with 2.25 x 12 mm Xience Alpine IAN, no other coronary artery disease, EF 55% with mild inferior hypokinesis  - Stress echo (03/29/2016) low risk for ischemia, good exercise capacity  - Echo (12/22/2016) EF 60-65%, normal RV size and function, normal diastolic function, all valves structurally and functionally normal  - VESTA (12/22/2016) normal arterial exam of the lower extremities  - Holter monitor (12/22/2016) sinus rhythm with rare atrial ventricular ectopic beats, no significant pauses, arrhythmias, or events, symptoms associated with sinus tachycardia  - Lipid panel (10/3/2017) total cholesterol 106, HDL 53, LDL 31, triglycerides 110  - Exercise nuclear stress (3/13/2019) negative for ischemia, EF 78%    Patient Active Problem List   Diagnosis   • Dysphagia   • GERD (gastroesophageal reflux disease)   • Tobacco abuse   • Coronary artery disease involving native coronary artery of native heart with angina pectoris (CMS/HCC)   • Dyslipidemia   • Essential hypertension       Social History     Tobacco Use   • Smoking status: Current Every Day Smoker     Packs/day: 1.50     Years: 20.00     Pack years:  30.00     Types: Cigarettes     Start date: 1976   • Smokeless tobacco: Never Used   Substance Use Topics   • Alcohol use: No   • Drug use: No       Family History   Problem Relation Age of Onset   • Diabetes Mother    • Cancer Father    • Heart disease Father        The following portions of the patient's history were reviewed and updated as appropriate: allergies, current medications, past family history, past medical history, past social history, past surgical history and problem list.      Current Outpatient Medications:   •  albuterol (PROVENTIL) (2.5 MG/3ML) 0.083% nebulizer solution, Take 2.5 mg by nebulization Every 6 (Six) Hours As Needed for wheezing (2 puffs)., Disp: , Rfl:   •  aspirin 81 MG EC tablet, Take 81 mg by mouth Daily., Disp: , Rfl:   •  atorvastatin (LIPITOR) 20 MG tablet, Take 1 tablet by mouth Daily., Disp: 30 tablet, Rfl: 11  •  diazePAM (VALIUM) 10 MG tablet, TK 1 T PO TID PRN, Disp: , Rfl: 1  •  HYDROcodone-acetaminophen (NORCO) 7.5-325 MG per tablet, TK 1 T PO Q 6 H, Disp: , Rfl: 0  •  isosorbide mononitrate (IMDUR) 30 MG 24 hr tablet, Take 1 tablet by mouth Daily., Disp: 30 tablet, Rfl: 11  •  metoprolol tartrate (LOPRESSOR) 25 MG tablet, Take 1 tablet by mouth 2 (Two) Times a Day., Disp: 60 tablet, Rfl: 11  •  pantoprazole (PROTONIX) 40 MG EC tablet, TK 1 T PO BID, Disp: , Rfl: 11  •  nicotine (NICODERM CQ) 21 MG/24HR patch, Place 1 patch on the skin as directed by provider Daily., Disp: 30 patch, Rfl: 11  •  nicotine polacrilex (NICORETTE) 4 MG gum, Chew 1 each As Needed for Smoking Cessation., Disp: 150 each, Rfl: 11  •  nitroglycerin (NITROSTAT) 0.4 MG SL tablet, Place 1 tablet under the tongue Every 5 (Five) Minutes As Needed for Chest Pain. Take no more than 3 doses in 15 minutes., Disp: 25 tablet, Rfl: 5    Review of Systems   Constitution: Negative for chills and fever.   HENT: Positive for hoarse voice and sore throat.    Cardiovascular: Negative for chest pain and paroxysmal  "nocturnal dyspnea.   Respiratory: Positive for shortness of breath. Negative for cough.    Skin: Negative for rash.   Musculoskeletal: Positive for joint pain and myalgias.   Gastrointestinal: Negative for abdominal pain and heartburn.   Neurological: Positive for headaches. Negative for dizziness and numbness.   Psychiatric/Behavioral: The patient is nervous/anxious.        Objective   /100 (BP Location: Left arm, Patient Position: Sitting, Cuff Size: Adult)   Pulse 89   Ht 154.9 cm (60.98\")   Wt 31.8 kg (70 lb)   SpO2 97%   BMI 13.23 kg/m²   Physical Exam   Constitutional: She is oriented to person, place, and time. She appears cachectic.   HENT:   Head: Normocephalic and atraumatic.   Cardiovascular: Normal rate, regular rhythm and normal heart sounds.   No murmur heard.  Pulmonary/Chest: Effort normal. She has wheezes.   Musculoskeletal: She exhibits no edema.   Neurological: She is alert and oriented to person, place, and time.   Skin: Skin is warm and dry.   Psychiatric: She has a normal mood and affect.     Procedures      ICD-10-CM ICD-9-CM   1. Coronary artery disease involving native coronary artery of native heart with angina pectoris (CMS/[facility]) I25.119 414.01     413.9   2. Dyslipidemia E78.5 272.4   3. Tobacco abuse Z72.0 305.1   4. Essential hypertension I10 401.9   5. Lung nodule R91.1 793.11         Assessment/Plan:  1. CAD: Status post PCI to RCA 10/2015.  Negative stress echo 3/2016 and recent negative nuclear stress 3/2019.  No recent chest pain.  Continue aspirin, atorvastatin, Imdur, metoprolol, and sublingual nitroglycerin.       2.  Tobacco abuse: Rin Soler is a current cigarettes user.  She currently smokes 1.5 packs of cigarettes per day for a duration of 30 years. I have educated her on the risk of diseases from using tobacco products such as cancer, COPD and heart diease. I advised her to quit and she is not willing to quit. I spent 4 minutes counseling the patient.     3. "  Dyslipidemia: Continue atorvastatin.      4.  Essential hypertension:  Blood pressure is elevated today.  Stress and anxiety over her recent lung issue may be contributing.  We discussed adjusting medications today but patient prefers to hold off at this time.  We can consider this in the future if it remains elevated.    5.  Lung nodule: Patient reports there is concern for malignancy which has caused patient quite a bit of stress and anxiety.  Being worked up by Dr. Newberry.

## 2020-02-21 PROBLEM — R91.1 PULMONARY NODULE: Status: ACTIVE | Noted: 2020-01-16

## 2020-02-21 PROBLEM — F17.210 CIGARETTE SMOKER: Status: ACTIVE | Noted: 2020-02-21

## 2020-02-25 ENCOUNTER — OFFICE VISIT (OUTPATIENT)
Dept: CARDIOTHORACIC SURGERY | Age: 55
End: 2020-02-25
Payer: MEDICAID

## 2020-02-25 VITALS
HEART RATE: 73 BPM | OXYGEN SATURATION: 98 % | BODY MASS INDEX: 13.03 KG/M2 | SYSTOLIC BLOOD PRESSURE: 106 MMHG | DIASTOLIC BLOOD PRESSURE: 102 MMHG | WEIGHT: 69 LBS | HEIGHT: 61 IN

## 2020-02-25 PROCEDURE — 99204 OFFICE O/P NEW MOD 45 MIN: CPT | Performed by: THORACIC SURGERY (CARDIOTHORACIC VASCULAR SURGERY)

## 2020-02-25 PROCEDURE — 3023F SPIROM DOC REV: CPT | Performed by: THORACIC SURGERY (CARDIOTHORACIC VASCULAR SURGERY)

## 2020-02-25 PROCEDURE — 3017F COLORECTAL CA SCREEN DOC REV: CPT | Performed by: THORACIC SURGERY (CARDIOTHORACIC VASCULAR SURGERY)

## 2020-02-25 PROCEDURE — G8484 FLU IMMUNIZE NO ADMIN: HCPCS | Performed by: THORACIC SURGERY (CARDIOTHORACIC VASCULAR SURGERY)

## 2020-02-25 PROCEDURE — G8419 CALC BMI OUT NRM PARAM NOF/U: HCPCS | Performed by: THORACIC SURGERY (CARDIOTHORACIC VASCULAR SURGERY)

## 2020-02-25 PROCEDURE — G8427 DOCREV CUR MEDS BY ELIG CLIN: HCPCS | Performed by: THORACIC SURGERY (CARDIOTHORACIC VASCULAR SURGERY)

## 2020-02-25 PROCEDURE — 4004F PT TOBACCO SCREEN RCVD TLK: CPT | Performed by: THORACIC SURGERY (CARDIOTHORACIC VASCULAR SURGERY)

## 2020-02-25 PROCEDURE — G8926 SPIRO NO PERF OR DOC: HCPCS | Performed by: THORACIC SURGERY (CARDIOTHORACIC VASCULAR SURGERY)

## 2020-02-25 RX ORDER — KETOROLAC TROMETHAMINE, LIDOCAINE HYDROCHLORIDE, POVIDINE IODINE
KIT INFILTRATION; INTRAMUSCULAR; INTRAVENOUS; TOPICAL
COMMUNITY

## 2020-02-25 RX ORDER — METOPROLOL TARTRATE 50 MG/1
50 TABLET, FILM COATED ORAL 2 TIMES DAILY
COMMUNITY

## 2020-02-25 RX ORDER — ATORVASTATIN CALCIUM 20 MG/1
20 TABLET, FILM COATED ORAL DAILY
COMMUNITY

## 2020-02-25 RX ORDER — NALOXONE HYDROCHLORIDE 4 MG/.1ML
1 SPRAY NASAL PRN
COMMUNITY

## 2020-02-25 RX ORDER — HYDROCODONE BITARTRATE AND ACETAMINOPHEN 10; 325 MG/1; MG/1
1 TABLET ORAL EVERY 6 HOURS PRN
COMMUNITY

## 2020-02-25 RX ORDER — MAGNESIUM OXIDE 400 MG/1
400 TABLET ORAL 2 TIMES DAILY
COMMUNITY

## 2020-02-25 RX ORDER — LORATADINE 10 MG/1
10 CAPSULE, LIQUID FILLED ORAL DAILY
COMMUNITY

## 2020-02-25 RX ORDER — ISOSORBIDE MONONITRATE 30 MG/1
30 TABLET, EXTENDED RELEASE ORAL DAILY
COMMUNITY

## 2020-02-25 RX ORDER — DIAZEPAM 10 MG/1
10 TABLET ORAL EVERY 8 HOURS PRN
COMMUNITY

## 2020-02-25 RX ORDER — PANTOPRAZOLE SODIUM 40 MG/1
40 TABLET, DELAYED RELEASE ORAL DAILY
COMMUNITY

## 2020-06-10 ENCOUNTER — OFFICE VISIT (OUTPATIENT)
Dept: CARDIOLOGY | Facility: CLINIC | Age: 55
End: 2020-06-10

## 2020-06-10 VITALS — HEIGHT: 67 IN | WEIGHT: 70 LBS | BODY MASS INDEX: 10.99 KG/M2

## 2020-06-10 DIAGNOSIS — Z72.0 TOBACCO ABUSE: ICD-10-CM

## 2020-06-10 DIAGNOSIS — I25.119 CORONARY ARTERY DISEASE INVOLVING NATIVE CORONARY ARTERY OF NATIVE HEART WITH ANGINA PECTORIS (HCC): Primary | ICD-10-CM

## 2020-06-10 DIAGNOSIS — I10 ESSENTIAL HYPERTENSION: ICD-10-CM

## 2020-06-10 DIAGNOSIS — E78.5 DYSLIPIDEMIA: ICD-10-CM

## 2020-06-10 DIAGNOSIS — R69 ILLNESS, UNSPECIFIED: ICD-10-CM

## 2020-06-10 PROBLEM — C34.90 LUNG CANCER (HCC): Status: ACTIVE | Noted: 2020-06-10

## 2020-06-10 PROBLEM — J44.9 COPD (CHRONIC OBSTRUCTIVE PULMONARY DISEASE): Status: ACTIVE | Noted: 2020-06-10

## 2020-06-10 PROCEDURE — 99442 PR PHYS/QHP TELEPHONE EVALUATION 11-20 MIN: CPT | Performed by: INTERNAL MEDICINE

## 2020-06-10 NOTE — PROGRESS NOTES
Reason for Visit: cardiovascular follow up.    HPI:  Rin Soler is a 55 y.o. female is here today for follow-up via telephone visit.  She has been feeling very unwell recently.  She reports multiple symptoms of chills, poor oral intake, fatigue, congestion, hoarseness, abdominal pain, nausea, acid reflux, chest pain, irregular heartbeats and palpitations, shortness of breath, increased sputum production, muscle weakness, and headache.  Symptoms have been going on for about a week but have significantly worsened over the past 48 hours.  She has an appointment to get into see her primary care doctor next week but is contemplating going to the emergency room.  Her chest pain she describes as sharp and not associated with activity.  She has not checked her blood pressure at home.  Every time she tries to eat food her nausea gets worse.  She reports that the work-up she had had for her lung nodule was consistent with cancer.  She saw Dr. Lee of CT surgery who told her that she was not a surgical candidate.  She is considering getting a second opinion.    You have chosen to receive care through a telephone visit. Do you consent to use a telephone visit for your medical care today? Yes  This visit has been rescheduled as a phone visit to comply with patient safety concerns in accordance with CDC recommendations. Total time of discussion was 17 minutes.    Previous Cardiac Testing and Procedures:  - Stress echo (08/04/2015) negative for ischemia  - Lipid panel (09/03/2015) 166/47/93/130  - LHC (10/05/2015) 90% mid RCA stenosis status post PCI with 2.25 x 12 mm Xience Alpine IAN, no other coronary artery disease, EF 55% with mild inferior hypokinesis  - Stress echo (03/29/2016) low risk for ischemia, good exercise capacity  - Echo (12/22/2016) EF 60-65%, normal RV size and function, normal diastolic function, all valves structurally and functionally normal  - VESTA (12/22/2016) normal arterial exam of the lower  extremities  - Holter monitor (12/22/2016) sinus rhythm with rare atrial ventricular ectopic beats, no significant pauses, arrhythmias, or events, symptoms associated with sinus tachycardia  - Lipid panel (10/3/2017) total cholesterol 106, HDL 53, LDL 31, triglycerides 110  - Exercise nuclear stress (3/13/2019) negative for ischemia, EF 78%    Patient Active Problem List   Diagnosis   • Dysphagia   • GERD (gastroesophageal reflux disease)   • Tobacco abuse   • Coronary artery disease involving native coronary artery of native heart with angina pectoris (CMS/[facility])   • Dyslipidemia   • Essential hypertension   • COPD (chronic obstructive pulmonary disease) (CMS/[facility])   • Lung cancer (CMS/[facility])       Social History     Tobacco Use   • Smoking status: Current Every Day Smoker     Packs/day: 1.50     Years: 20.00     Pack years: 30.00     Types: Cigarettes     Start date: 1976   • Smokeless tobacco: Never Used   Substance Use Topics   • Alcohol use: No   • Drug use: No       Family History   Problem Relation Age of Onset   • Diabetes Mother    • Cancer Father    • Heart disease Father        The following portions of the patient's history were reviewed and updated as appropriate: allergies, current medications, past family history, past medical history, past social history, past surgical history and problem list.      Current Outpatient Medications:   •  albuterol (PROVENTIL) (2.5 MG/3ML) 0.083% nebulizer solution, Take 2.5 mg by nebulization Every 6 (Six) Hours As Needed for wheezing (2 puffs)., Disp: , Rfl:   •  aspirin 81 MG EC tablet, Take 81 mg by mouth Daily., Disp: , Rfl:   •  atorvastatin (LIPITOR) 20 MG tablet, Take 1 tablet by mouth Daily., Disp: 30 tablet, Rfl: 11  •  diazePAM (VALIUM) 10 MG tablet, TK 1 T PO TID PRN, Disp: , Rfl: 1  •  HYDROcodone-acetaminophen (NORCO) 7.5-325 MG per tablet, TK 1 T PO Q 6 H, Disp: , Rfl: 0  •  isosorbide mononitrate (IMDUR) 30 MG 24 hr tablet, Take 1 tablet by mouth Daily.,  "Disp: 30 tablet, Rfl: 11  •  metoprolol tartrate (LOPRESSOR) 25 MG tablet, Take 1 tablet by mouth 2 (Two) Times a Day., Disp: 60 tablet, Rfl: 11  •  pantoprazole (PROTONIX) 40 MG EC tablet, TK 1 T PO BID, Disp: , Rfl: 11  •  nitroglycerin (NITROSTAT) 0.4 MG SL tablet, Place 1 tablet under the tongue Every 5 (Five) Minutes As Needed for Chest Pain. Take no more than 3 doses in 15 minutes., Disp: 25 tablet, Rfl: 5    Review of Systems   Constitution: Positive for chills, decreased appetite and malaise/fatigue. Negative for fever.   HENT: Positive for congestion and hoarse voice.    Cardiovascular: Positive for chest pain, irregular heartbeat and palpitations. Negative for paroxysmal nocturnal dyspnea.   Respiratory: Positive for shortness of breath and sputum production. Negative for cough.    Skin: Negative for rash.   Musculoskeletal: Positive for muscle weakness.   Gastrointestinal: Positive for abdominal pain, heartburn and nausea.   Neurological: Positive for headaches. Negative for dizziness and numbness.       Objective   Ht 170 cm (66.93\")   Wt 31.8 kg (70 lb)   BMI 10.99 kg/m²   Physical Exam   Constitutional:   Unable to complete full physical exam due to telephone visit   Psychiatric: She has a normal mood and affect. Her speech is normal and behavior is normal. Judgment and thought content normal. Cognition and memory are normal.     Procedures      ICD-10-CM ICD-9-CM   1. Coronary artery disease involving native coronary artery of native heart with angina pectoris (CMS/[facility]) I25.119 414.01     413.9   2. Tobacco abuse Z72.0 305.1   3. Dyslipidemia E78.5 272.4   4. Essential hypertension I10 401.9   5. Illness, unspecified R69 799.9         Assessment/Plan:  1.  Coronary artery disease: Status post PCI to RCA 10/2015.  Negative stress echo 3/2016 and  negative nuclear stress 3/2019.  Only atypical sounding chest pain is more consistent with noncardiac etiology.  Continue aspirin, atorvastatin, Imdur, " metoprolol, and SL nitroglycerin.       2.  Tobacco abuse: Rin Soler  reports that she has been smoking cigarettes. She started smoking about 44 years ago. She has a 30.00 pack-year smoking history. She has never used smokeless tobacco.. I have educated her on the risk of diseases from using tobacco products such as cancer, COPD and heart diease.  I advised her to quit and she is not willing to quit.  I spent 3  minutes counseling the patient.     3.  Dyslipidemia: Continue atorvastatin.       4.  Essential hypertension:  Blood pressure was elevated at last office visit.  Patient has not been able to check her blood pressure at home.     5.    General illness: Patient with a variety of nonspecific complaints and feeling poorly.  Given the severity of her symptoms and unclear etiology she is encouraged to come to the emergency room for evaluation as soon as possible.  Patient acknowledged understanding.

## 2021-03-10 ENCOUNTER — TELEPHONE (OUTPATIENT)
Dept: CARDIAC SURGERY | Facility: CLINIC | Age: 56
End: 2021-03-10

## 2021-03-10 NOTE — TELEPHONE ENCOUNTER
Néstor with Dr. Newberry's office in [place] called wanting to see about referring this patient to Dr. Peguero for a possible Biopsy of lung mass. States pt saw Dr. Justice and is wanting a 2nd opinion. She has faxed over records that have been filed in chart. She is also working on sending us CT to review prior to scheduling to see if this can be done. Placed in referral book to be reviewed and a message sent. She verbalized understanding.

## 2021-03-15 NOTE — TELEPHONE ENCOUNTER
Sol at [facility] calling to check status on scheduling for this pt.  Can reach her at #[phone]/rohit

## 2021-03-16 NOTE — TELEPHONE ENCOUNTER
Sol at [facility] left vm message stating calling again for a status check on this referral.  Can reach her at #[phone]/rohit

## 2021-03-16 NOTE — TELEPHONE ENCOUNTER
Mess left for pt to call back re: sched appt with Dr Peguero.  If pt calls back, can offer appt on 3-23-21 at 8am or 11:30am./rohit

## 2021-03-16 NOTE — TELEPHONE ENCOUNTER
Néstor with [facility] was also informed that we have offered pt an appt for next week.   SUBJECTIVE:                                                      Abiodun Lowe is a 7 year old male, here for a routine health maintenance visit.    Patient was roomed by: Alba Bonilla    Well Child     Social History  Patient accompanied by:  Mother and brother  Questions or concerns?: YES (talk about sleep)    Forms to complete? No  Child lives with::  Mother, father and brother  Who takes care of your child?:  School  Languages spoken in the home:  English  Recent family changes/ special stressors?:  Job change    Safety / Health Risk  Is your child around anyone who smokes?  No    TB Exposure:     No TB exposure    Car seat or booster in back seat?  Yes  Helmet worn for bicycle/roller blades/skateboard?  Yes    Home Safety Survey:      Firearms in the home?: No       Child ever home alone?  No    Daily Activities    Dental     Dental provider: patient has a dental home    Risks: child has or had a cavity    Water source:  City water and filtered water    Diet and Exercise     Child gets at least 4 servings fruit or vegetables daily: Yes    Consumes beverages other than lowfat white milk or water: No    Dairy/calcium sources: skim milk, yogurt and cheese    Calcium servings per day: >3    Child gets at least 60 minutes per day of active play: Yes    TV in child's room: No    Sleep       Sleep concerns: frequent waking     Bedtime: 08:30     Sleep duration (hours): 10    Elimination  Normal urination and normal bowel movements    Media     Types of media used: iPad, video/dvd/tv and computer/ video games    Daily use of media (hours): 1    Activities    Activities: age appropriate activities, playground, rides bike (helmet advised) and other    Organized/ Team sports: soccer and swimming    School    Name of school: [employer]    Grade level: 2nd    School performance: doing well in school    Schooling concerns? no    Days missed current/ last year: 0    Academic problems: no problems in  reading, no problems in mathematics, no problems in writing and no learning disabilities     Behavior concerns: no current behavioral concerns in school and no current behavioral concerns with adults or other children        VISION   No corrective lenses (H Plus Lens Screening required)  Tool used: SANDOVAL  Right eye: 10/12.5 (20/25)  Left eye: 10/16 (20/32)   Two Line Difference: No  Visual Acuity: Pass  H Plus Lens Screening: Pass    Vision Assessment: normal        HEARING  Right Ear:       500 Hz: RESPONSE- on Level:   25 db    1000 Hz: RESPONSE- on Level:   25 db    2000 Hz: RESPONSE- on Level:   20 db    4000 Hz: RESPONSE- on Level:   20 db   Left Ear:       500 Hz: RESPONSE- on Level:   25 db    1000 Hz: RESPONSE- on Level:   20 db    2000 Hz: RESPONSE- on Level:   20 db    4000 Hz: RESPONSE- on Level:   20 db   Question Validity: no  Hearing Assessment: normal      PROBLEM LIST  Patient Active Problem List   Diagnosis     Open fracture of distal phalangeal tuft     MEDICATIONS  Current Outpatient Prescriptions   Medication Sig Dispense Refill     triamcinolone (KENALOG) 0.1 % cream Apply sparingly to affected area three times daily as needed 80 g 0      ALLERGY  No Known Allergies    IMMUNIZATIONS  Immunization History   Administered Date(s) Administered     DTAP (<7y) 02/04/2011     DTAP-IPV, <7Y (KINRIX) 11/06/2014     DTAP-IPV/HIB (PENTACEL) 2009, 02/26/2010, 04/30/2010     HEPA 11/05/2010, 05/27/2011     HIB 02/04/2011     HepB 2009, 2009, 04/30/2010     Influenza (IIV3) 11/05/2010, 12/03/2010, 11/04/2011, 10/04/2012     Influenza Intranasal Vaccine 4 valent 11/07/2013     Influenza Vaccine IM 3yrs+ 4 Valent IIV4 11/06/2014, 11/05/2015, 11/11/2016     MMR 11/05/2010, 11/06/2014     Pneumococcal (PCV 13) 04/30/2010, 02/04/2011     Pneumococcal (PCV 7) 2009, 02/26/2010     Rotavirus, pentavalent, 3-dose 2009, 02/26/2010, 04/30/2010     Varicella 11/05/2010, 11/06/2014  "      HEALTH HISTORY SINCE LAST VISIT      MENTAL HEALTH  Social-Emotional screening:    Electronic PSC-17   PSC SCORES 11/6/2017   Inattentive / Hyperactive Symptoms Subtotal 1   Externalizing Symptoms Subtotal 2   Internalizing Symptoms Subtotal 1   PSC-17 TOTAL SCORE 4      no followup necessary  No concerns    ROS  GENERAL: See health history, nutrition and daily activities   SKIN: No  rash, hives or significant lesions  HEENT: Hearing/vision: see above.  No eye, nasal, ear symptoms.  RESP: No cough or other concerns  CV: No concerns  GI: See nutrition and elimination.  No concerns.  : See elimination. No concerns  NEURO: No headaches or concerns.    OBJECTIVE:   EXAM  BP 99/62 (BP Location: Left arm, Patient Position: Sitting, Cuff Size: Child)  Pulse 72  Temp 98.2  F (36.8  C) (Oral)  Ht 4' 3\" (1.295 m)  Wt 64 lb 8 oz (29.3 kg)  BMI 17.44 kg/m2  60 %ile based on CDC 2-20 Years stature-for-age data using vitals from 11/8/2017.  78 %ile based on CDC 2-20 Years weight-for-age data using vitals from 11/8/2017.  80 %ile based on CDC 2-20 Years BMI-for-age data using vitals from 11/8/2017.  Blood pressure percentiles are 47.2 % systolic and 58.4 % diastolic based on NHBPEP's 4th Report.   GENERAL: Active, alert, in no acute distress.  SKIN: Clear. No significant rash, abnormal pigmentation or lesions  HEAD: Normocephalic.  EYES:  Symmetric light reflex and no eye movement on cover/uncover test. Normal conjunctivae.  EARS: Normal canals. Tympanic membranes are normal; gray and translucent.  NOSE: Normal without discharge.  MOUTH/THROAT: Clear. No oral lesions. Teeth without obvious abnormalities.  NECK: Supple, no masses.  No thyromegaly.  LYMPH NODES: No adenopathy  LUNGS: Clear. No rales, rhonchi, wheezing or retractions  HEART: Regular rhythm. Normal S1/S2. No murmurs. Normal pulses.  ABDOMEN: Soft, non-tender, not distended, no masses or hepatosplenomegaly. Bowel sounds normal.   GENITALIA: Normal male " external genitalia. Nima stage I,  both testes descended, no hernia or hydrocele.    EXTREMITIES: Full range of motion, no deformities  NEUROLOGIC: No focal findings. Cranial nerves grossly intact: DTR's normal. Normal gait, strength and tone    ASSESSMENT/PLAN:       ICD-10-CM    1. Encounter for routine child health examination w/o abnormal findings Z00.129    2. Need for prophylactic vaccination and inoculation against influenza Z23 FLU VAC, SPLIT VIRUS IM > 3 YO (QUADRIVALENT) [32628]     Vaccine Administration, Initial [[id number]]     Mother mentioned about sleep at the end of visit  He sleeps well, but tends to wake up and then he goes right back to sleep, n other concerns    Anticipatory Guidance  The following topics were discussed:  SOCIAL/ FAMILY:  NUTRITION:  HEALTH/ SAFETY:    Preventive Care Plan  Immunizations    Reviewed, up to date  Referrals/Ongoing Specialty care: No   See other orders in [facility].  BMI at 80 %ile based on CDC 2-20 Years BMI-for-age data using vitals from 11/8/2017.  No weight concerns.  Dental visit recommended: Yes  DENTAL VARNISH    FOLLOW-UP:    in 1-2 years for a Preventive Care visit        Resources  Goal Tracker: Be More Active  Goal Tracker: Less Screen Time  Goal Tracker: Drink More Water  Goal Tracker: Eat More Fruits and Veggies    DO KATHYA Mcintosh [facility]

## 2021-03-18 NOTE — TELEPHONE ENCOUNTER
Still no response from pt so called again today.  Appt sched for 3-23-21 at 11:30am with Dr Peguero/rohit

## 2021-03-23 ENCOUNTER — OFFICE VISIT (OUTPATIENT)
Dept: CARDIAC SURGERY | Facility: CLINIC | Age: 56
End: 2021-03-23

## 2021-03-23 VITALS
DIASTOLIC BLOOD PRESSURE: 90 MMHG | SYSTOLIC BLOOD PRESSURE: 158 MMHG | HEART RATE: 86 BPM | WEIGHT: 68.2 LBS | BODY MASS INDEX: 10.7 KG/M2 | OXYGEN SATURATION: 98 % | HEIGHT: 67 IN

## 2021-03-23 DIAGNOSIS — I25.119 CORONARY ARTERY DISEASE INVOLVING NATIVE CORONARY ARTERY OF NATIVE HEART WITH ANGINA PECTORIS (HCC): Primary | ICD-10-CM

## 2021-03-23 DIAGNOSIS — Z72.0 TOBACCO ABUSE: ICD-10-CM

## 2021-03-23 DIAGNOSIS — J44.9 CHRONIC OBSTRUCTIVE PULMONARY DISEASE, UNSPECIFIED COPD TYPE (HCC): ICD-10-CM

## 2021-03-23 PROCEDURE — 99417 PROLNG OP E/M EACH 15 MIN: CPT | Performed by: THORACIC SURGERY (CARDIOTHORACIC VASCULAR SURGERY)

## 2021-03-23 PROCEDURE — 99205 OFFICE O/P NEW HI 60 MIN: CPT | Performed by: THORACIC SURGERY (CARDIOTHORACIC VASCULAR SURGERY)

## 2021-03-31 ENCOUNTER — TELEPHONE (OUTPATIENT)
Dept: CARDIAC SURGERY | Facility: CLINIC | Age: 56
End: 2021-03-31

## 2021-04-01 ENCOUNTER — TELEPHONE (OUTPATIENT)
Dept: CARDIAC SURGERY | Facility: CLINIC | Age: 56
End: 2021-04-01

## 2021-04-01 DIAGNOSIS — R91.8 LUNG MASS: Primary | ICD-10-CM

## 2021-04-01 NOTE — TELEPHONE ENCOUNTER
"Called patient back and Dr. Peguero spoke with her. Advised that contrast is needed to better evaluate the lung mass near the mediastinum. Separately, she states she is having chest pain similar to her past heart attacks, having chest pain and left arm pain. She was advised to proceed to the ER. She is very hesitant and states \"I just don't know what to do\" 20 minutes spent trying to convince her to go to the ER for evaluation. Emotional support provided. Says she will go the closest ER but is not sure if she will go tonight.   Asked her to call our office tomorrow morning and let us know where she went so we can get records. "

## 2021-04-01 NOTE — TELEPHONE ENCOUNTER
Called pt back regarding this, she states that she worries about the contrast because in the past she has had it she had a burning feeling in her body for weeks after. States she is just unsure why she needs another CT, explained the other test was without contrast so the contrast will give Dr. Peguero a better look. Patient was somewhat emotional and states she is just really stressed and wants to find out something. Also states she wants Dr. Peguero to know she has had heart disease in the past and had a stent placed. She is worried because she has been having chest pain lately, I advised that she may want to proceed to ER if chest pain worsens. She verbalize understanding, she has not seen her Cardiologist in quite some time.

## 2021-04-01 NOTE — TELEPHONE ENCOUNTER
Pt left vm message returning this call re: sched CT.  Pt states she has some questions before scheduling this test.  Requesting call back at #[phone]/rohit

## 2021-04-01 NOTE — TELEPHONE ENCOUNTER
Dr. Peguero reviewed imaging and would like for pt to get a CT of the Chest w/ contrast. Once this is complete he will discuss with pt. Left VM for pt to call back regarding this, CT was scheduled for 4/9.

## 2021-04-02 NOTE — TELEPHONE ENCOUNTER
"Called [facility] to see it pt had presented to their ED for evaluation.  No record of pt at [facility] since 3-5-21.  Called pt to check on her this morning.  Pt states she did not go to ER.  States she is considering calling her \"other dr\" to see what they want to do.  Asked pt if she was still having pain.  Pt states she has \"spells\" off and on where she has pain in her chest and arm and it is so bad she cannot speak, cannot breathe and \"just has to lie down and have someone talk to her until it passes\".  Advised pt that this is not normal and she really needs to be evaluated by someone today.  Pt states she has not had any \"spells\" this morning.  Pt states she does not want to go to the ER because she does not want to be admitted.  Advised pt that the ER would not admit her unless it was necessary.  Advised pt that if these spells continue, they may get worse and she may not have time to get the help she needs.  Pt voiced understanding and ultimately stated she would address it \"eventually\".  Pt advised to call our office and let us know what she decides to do and who she sees (if anyone) so we can acquire the records for her chart here.  Pt voiced understanding to all/rohit  "

## 2021-04-07 ENCOUNTER — TELEPHONE (OUTPATIENT)
Dept: CARDIAC SURGERY | Facility: CLINIC | Age: 56
End: 2021-04-07

## 2021-04-07 ENCOUNTER — TELEPHONE (OUTPATIENT)
Dept: CARDIOLOGY | Facility: CLINIC | Age: 56
End: 2021-04-07

## 2021-04-07 NOTE — TELEPHONE ENCOUNTER
"Patient sees dr bonner but called this morning and stated she has been dx with lung cancer about a year ago. She stated she's been having problems with her heart. Patient has severe chest pain, left arm pain, through her back, run down her right arm some, patient can barely speak, soa, when this happens.  Stated this happened before when she had a blockage.  Dr lara has instructed her to go to the ER. She has not been to the ER.   Patient stated the \"spell\" usually lasts 5-10 minutes, then it will start easing up, but she cannot get up from lying. 3-, 3-, and 3-  She is exhausted and fatigued afterwards.  She stated she has experienced this before.  Her BP has been elevated, but has not been keeping a log.  She is under a lot of stress (seeing oncology, tests, son threatening suicide, etc)    Was referred to dr barrera at [facility] last year. He said there was nothing they could do for her. Being put to sleep alone would kill her, the cancer is too close to the heart.     Dr lara wants her to get her heart checked out before he moves forward with treatment.    Spoke with practice lead, Armida, she instructed to make follow up appt with Dr. Bonner  "

## 2021-04-07 NOTE — TELEPHONE ENCOUNTER
Pt calling.  States she has appt for CT on 4-9-21.  On prev calls, she had described the probs she was having with her heart and states Dr Peguero told her to go to ER or to see her cardiologist.  Pt states she did not go to ER but has sched appt with Dr Cunningham for 4-28-21.  Pt wondering if she should still have the CT done.  Can reach her at #[phone]/heber

## 2021-04-09 ENCOUNTER — HOSPITAL ENCOUNTER (OUTPATIENT)
Dept: CT IMAGING | Facility: HOSPITAL | Age: 56
Discharge: HOME | End: 2021-04-09
Admitting: THORACIC SURGERY (CARDIOTHORACIC VASCULAR SURGERY)

## 2021-04-09 DIAGNOSIS — R91.8 LUNG MASS: ICD-10-CM

## 2021-04-09 PROCEDURE — 71260 CT THORAX DX C+: CPT

## 2021-04-09 PROCEDURE — 25010000002 IOPAMIDOL 61 % SOLUTION: Performed by: THORACIC SURGERY (CARDIOTHORACIC VASCULAR SURGERY)

## 2021-04-09 PROCEDURE — 82565 ASSAY OF CREATININE: CPT

## 2021-04-09 RX ADMIN — IOPAMIDOL 50 ML: 612 INJECTION, SOLUTION INTRAVENOUS at 10:51

## 2021-04-09 NOTE — PROGRESS NOTES
"    Chief Complaint   Patient presents with   • Lung Mass     New pt from Dr Newberry         Subjective     History of Present Illness    56-year-old female with salient past medical history of coronary artery disease, history of previous stent, least a 65-pack-year history of smoking and currently is so, known advanced COPD, chronic back pain, fibromyalgia, irritable bowel syndrome, and malnutrition presents my office for evaluation for possible resection of an identified right upper lobe lung nodule known since at least August 2019.  The lung nodule identified incidentally during work-up for chronic back pain.  Dr. Newberry did evaluate and with pulmonary function tests available at that time reported as severe obstructive lung disease with a DLCO of 20% she was referred to Dr. Jason Justice for surgical opinion.  She reports that he gave her the impression it was hopeless.  I was able to review on care everywhere his opinion:    This unfortunate 55-year-old female is chronically ill and has a litany of active severe medical problems doing severe COPD with emphysema coronary disease myalgia chronic back pain Reilly's esophagus. She becomes \"winded\" quite easily with even walking across the room. She has extensive smoking history 2 packs/day for more than 40 years. She has never tried to quit. I believe that this nodule in her right upper lobe is likely a malignancy I also agree that transthoracic needle biopsy would be risky considering that it is close to her ascending thoracic aorta. At this time, the patient is not a candidate for open surgical biopsy or excision symptoms of extreme shortness of breath and markedly diminished DLCO preclude any attempt at major thoracic surgery. SBRT would be an alternate option although this would have to be offered on a \"compassionate\" basis as a needle biopsy for tissue diagnosis cannot be safely performed. For the same reason chemotherapy alone is not an option without a " "tissue diagnosis. The final option is to do nothing but \"let nature take its course\". I went through all of these options in detail with the patient her daughter and family. I left the door open regarding open surgical excision this would be completely dependent on her ability to quit smoking for least 4 to 6 weeks and show some reversibility on her DLCO and repeat pulmonary function studies. She is going to go home and think over the options and let me know her decision    This consultation was performed in February of last year.  Since then she has struggled with what choices were available and felt that it was hopeless.  Dr. Newberry was able to convince her to proceed with new consultation and today's consultation stands as that consultation.  She denies hoarseness of voice, hemoptysis, history of pneumonia, or cough.  She does have some chest pain anteriorly perhaps biased to the right.  She does have chest pain with exertion.  This does subside with rest.  She has chest pain at rest as well.  She is always been fairly thin but does acknowledge having unintended weight loss.  She is joined by her  today.  She reports being quite anxious for today's consultation.        Review of Systems   Constitutional: Positive for activity change and fatigue. Negative for appetite change, chills, diaphoresis, fever and unexpected weight change.   HENT: Positive for congestion. Negative for dental problem, hearing loss, nosebleeds, sore throat, trouble swallowing and voice change.    Eyes: Negative for photophobia, redness and visual disturbance.   Respiratory: Positive for shortness of breath. Negative for apnea, cough, chest tightness, wheezing and stridor.    Cardiovascular: Positive for chest pain. Negative for palpitations and leg swelling.   Gastrointestinal: Negative for abdominal distention, abdominal pain, blood in stool, constipation, diarrhea, nausea and vomiting.   Endocrine: Negative for cold intolerance, " heat intolerance, polyphagia and polyuria.   Genitourinary: Negative for decreased urine volume, difficulty urinating, dysuria, flank pain, frequency, hematuria and urgency.   Musculoskeletal: Positive for arthralgias and back pain. Negative for gait problem, joint swelling, myalgias and neck pain.   Skin: Negative for pallor, rash and wound.   Allergic/Immunologic: Negative for immunocompromised state.   Neurological: Positive for weakness and light-headedness. Negative for dizziness, tremors, seizures, syncope, speech difficulty, numbness and headaches.   Hematological: Bruises/bleeds easily.   Psychiatric/Behavioral: Positive for decreased concentration. Negative for confusion, sleep disturbance and suicidal ideas. The patient is nervous/anxious.           Past Medical History:   Diagnosis Date   • Abnormal stress test    • Abnormal x-ray of lungs with single pulmonary nodule    • Anxiety    • Reilly's esophagus    • Bradycardia    • Cancer (CMS/HCC)     LUNG   • Candidiasis of mouth    • Chest pain in adult    • Chronic fatigue    • Chronic low back pain    • COPD (chronic obstructive pulmonary disease) (CMS/HCC)    • Coronary arteriosclerosis    • Coronary artery disease involving native artery of transplanted heart without angina pectoris    • Costochondritis    • Costochondritis, acute    • Deviated nasal septum     left   • Dyspnea on exertion    • Emphysema of lung (CMS/HCC)    • Endometriosis    • Fibromyalgia    • Fibromyalgia syndrome    • GERD (gastroesophageal reflux disease)    • Hernia, hiatal    • Hyperlipidemia    • Hypertension     Goal 140/90   • IBS (irritable bowel syndrome)    • Intercostal pain    • Pain in both lower extremities    • S/P angioplasty with stent    • Tobacco abuse counseling      Past Surgical History:   Procedure Laterality Date   • BREAST SURGERY      augmentation   • CARDIAC CATHETERIZATION  10/05/2015    Left Heart with stents   •  SECTION      x 3   •  "CHOLECYSTECTOMY     • CORONARY ARTERY BYPASS GRAFT      stent   • CYSTOSCOPY BLADDER HYDRODISTENSION     • DILATATION AND CURETTAGE     • ENDOSCOPY     • FULGURATION ENDOMETRIOSIS       Family History   Problem Relation Age of Onset   • Diabetes Mother    • Cancer Father    • Heart disease Father      Social History     Tobacco Use   • Smoking status: Current Every Day Smoker     Packs/day: 1.50     Years: 20.00     Pack years: 30.00     Types: Cigarettes     Start date: 1976   • Smokeless tobacco: Never Used   Substance Use Topics   • Alcohol use: No   • Drug use: No     Current Outpatient Medications   Medication Sig Dispense Refill   • aspirin 81 MG EC tablet Take 81 mg by mouth Daily.     • atorvastatin (LIPITOR) 20 MG tablet Take 1 tablet by mouth Daily. 30 tablet 11   • diazePAM (VALIUM) 10 MG tablet TK 1 T PO TID PRN  1   • HYDROcodone-acetaminophen (NORCO) 7.5-325 MG per tablet TK 1 T PO Q 6 H  0   • isosorbide mononitrate (IMDUR) 30 MG 24 hr tablet Take 1 tablet by mouth Daily. 30 tablet 11   • metoprolol tartrate (LOPRESSOR) 25 MG tablet Take 1 tablet by mouth 2 (Two) Times a Day. 60 tablet 11   • nitroglycerin (NITROSTAT) 0.4 MG SL tablet Place 1 tablet under the tongue Every 5 (Five) Minutes As Needed for Chest Pain. Take no more than 3 doses in 15 minutes. 25 tablet 5   • pantoprazole (PROTONIX) 40 MG EC tablet TK 1 T PO BID  11   • albuterol (PROVENTIL) (2.5 MG/3ML) 0.083% nebulizer solution Take 2.5 mg by nebulization Every 6 (Six) Hours As Needed for wheezing (2 puffs).       No current facility-administered medications for this visit.     Allergies:  Plavix [clopidogrel bisulfate]    Objective      Vital Signs  Visit Vitals  /90 (BP Location: Left arm, Patient Position: Sitting, Cuff Size: Adult)   Pulse 86   Ht 170 cm (66.93\")   Wt 30.9 kg (68 lb 3.2 oz)   SpO2 98%   BMI 10.70 kg/m²         Physical Exam    Results Review:                           I reviewed the patient's new clinical " results.    Discussed with patient and       Assessment/Plan       Diagnoses and all orders for this visit:    1. Coronary artery disease involving native coronary artery of native heart with angina pectoris (CMS/HCC) (Primary)    2. Tobacco abuse    3. Chronic obstructive pulmonary disease, unspecified COPD type (CMS/HCC)          I discussed the patients findings and my recommendations with patient and .    Independent review of PET scan/CT scan was performed by me.  There is a right upper lobe spiculated lesion measuring 1.4 cm in size and is hypermetabolic.  The lesion is highly concerning for malignancy.  Interval study approximate 1 year apart and show marked growth with the lesion now reported to measure up to 6.7 cm in size.  Unfortunately the most recent study is not available for me to review.        Based on available pulmonary function tests she is not frankly prohibitive for resection.  She does have dyspnea but whether this is secondary to her heart or is this due to her lung function I will defer until we have further evaluation of her cardiac status.  We did discuss the differential diagnoses possible with malignancy high in the differential.  Repeat PET scan is not needed at this time in my opinion.  Given her low BMI any surgical solution will carry increased risk for morbidity and mortality.  I did inform her that her significant anxiety will likely impact her perioperative recovery.  Based on the size of the lesion last year stereotactic radiation would have been helpful; however, at this time based on the reported size of lesion I suspect radiation will be less helpful.  Potentially the lesion could be biopsied via CT-guided needle biopsy or by navigational endoscopy.   Based on the available data I would recommend cervical mediastinoscopy to evaluate her mediastinum given the size of the lesion reported.  If the diagnosis is achieved treatment will be advanced based on that  staging determined.  We discussed options for care including continued surveillance verses efforts towards diagnosis and treatment.  We discussed options for diagnosis including bronchoscopy, CT-guided needle biopsy, or surgical biopsy with either cervical mediastinoscopy or thoracoscopy with resection.We discussed the value of clinical staging with a CT/PET scan.  The pros and cons of each option were discussed at length.  I believe further efforts towards diagnosis are warranted.  The best option is efforts for staging and diagnosis with cervical mediastinoscopy.  The risks of the procedure were discussed to included but not limited to bleeding, infection, stroke, heart attack, anesthesia risks, need for additional procedures, great vessel injury, injury to nerve with resultant hoarseness of voice, mechanical ventilation, ICU stay, chronic pain syndromes, and/or death.    If her mediastinum is negative for malignancy and her cardiac status is suitable to proceed forward, we discussed I would advocate for smoking cessation optimally prior to resection but that would not be frankly a definitive barrier.  She expressed to me several times during this office visit she is really not sure what she should do and whether she should just do nothing.  I try to stay on point discussing objectively all her available options.  I answered all questions the best my ability and she was agreeable to aforementioned plan beginning with obtaining imaging most recently obtained so that I can confirm potential resectability or release guide our biopsy efforts.  I spent at least 15 minutes discussing smoking cessation for both cardiovascular and oncologic best outcome long-term but at this time focusing on perioperative morbidity and mortality.  Unfortunately she seems uninterested in quitting smoking even though she acknowledged that she should reporting that she just cannot quit smoking.  I did encourage her to speak with her primary  care physician as well as Dr. Newberry to help guide that endeavor.    After this office visit the CT scan of the chest most recent obtained was reviewed by me is a noncontrasted study.  I am concerned as to potential mediastinal invasion.  A CT scan of the chest with contrast has been advised.  The office is coordinating that efforts at this time.    All questions have been answered to the best of my ability and she is agreeable to the aforementioned plan.    Based on obtaining that CT scan of the chest we will proceed forward with further recommendations forthcoming    Total time spent with greater than 50% time counseling and offer supportive encouragement of the patient and her  is 94 minutes.  I was quite [id number] that if she took another year before she took an extra effort to proceed with this work-up/treatment that likely this malignancy will result in her death.  She did acknowledge this.

## 2021-04-12 LAB — CREAT BLDA-MCNC: 0.7 MG/DL (ref 0.6–1.3)

## 2021-04-19 ENCOUNTER — TELEPHONE (OUTPATIENT)
Dept: CARDIAC SURGERY | Facility: CLINIC | Age: 56
End: 2021-04-19

## 2021-04-19 NOTE — TELEPHONE ENCOUNTER
Called pt to schedule appt with Dr. Peguero on 4/21 @ 1:30. No answer, left VM for patient to call back.

## 2021-04-20 DIAGNOSIS — R91.8 LUNG MASS: Primary | ICD-10-CM

## 2021-04-20 NOTE — TELEPHONE ENCOUNTER
Called and spoke to patient, she states she does not have a ride so she can't make appt tomorrow 4/21. Offered 5/5, pt states she does not have a ride until 5/12. Appt scheduled for 5/12 @ 11.

## 2021-04-21 ENCOUNTER — TELEPHONE (OUTPATIENT)
Dept: CARDIAC SURGERY | Facility: CLINIC | Age: 56
End: 2021-04-21

## 2021-04-21 NOTE — TELEPHONE ENCOUNTER
Called pt to inform her of Needle Biopsy scheduled for 4/29 @ 10. [facility] NPO after midnight and pt will need a . She will also be called for COVID test. Per radiologist pt is okay to continue 81 mg aspirin. Left VM for her to call office back for this info.

## 2021-04-22 NOTE — TELEPHONE ENCOUNTER
Called patient again to inform her of this, Erica Rg also spoke to patient as she had some additional questions and worries. Patient was informed to arrive at 8:30 am on 4/29 at [facility]. NPO after midnight the night before and she can continue aspirin. Patient also notified she will be called for COVID testing scheduling. She verbalized understanding to all.

## 2021-04-22 NOTE — TELEPHONE ENCOUNTER
DW patient as well. She reports being scared of needle biopsy. Provided support and additional information. Answered questions to the best of my ability.

## 2021-04-26 ENCOUNTER — TRANSCRIBE ORDERS (OUTPATIENT)
Dept: LAB | Facility: HOSPITAL | Age: 56
End: 2021-04-26

## 2021-04-28 ENCOUNTER — TELEPHONE (OUTPATIENT)
Dept: CARDIAC SURGERY | Facility: CLINIC | Age: 56
End: 2021-04-28

## 2021-04-28 ENCOUNTER — OFFICE VISIT (OUTPATIENT)
Dept: CARDIOLOGY | Facility: CLINIC | Age: 56
End: 2021-04-28

## 2021-04-28 VITALS
HEIGHT: 67 IN | SYSTOLIC BLOOD PRESSURE: 190 MMHG | WEIGHT: 69 LBS | DIASTOLIC BLOOD PRESSURE: 96 MMHG | HEART RATE: 79 BPM | OXYGEN SATURATION: 97 % | BODY MASS INDEX: 10.83 KG/M2

## 2021-04-28 DIAGNOSIS — I10 ESSENTIAL HYPERTENSION: ICD-10-CM

## 2021-04-28 DIAGNOSIS — R91.8 LUNG MASS: ICD-10-CM

## 2021-04-28 DIAGNOSIS — E78.5 DYSLIPIDEMIA: ICD-10-CM

## 2021-04-28 DIAGNOSIS — Z72.0 TOBACCO ABUSE: ICD-10-CM

## 2021-04-28 DIAGNOSIS — I25.119 CORONARY ARTERY DISEASE INVOLVING NATIVE CORONARY ARTERY OF NATIVE HEART WITH ANGINA PECTORIS (HCC): Primary | ICD-10-CM

## 2021-04-28 PROCEDURE — 93000 ELECTROCARDIOGRAM COMPLETE: CPT | Performed by: INTERNAL MEDICINE

## 2021-04-28 PROCEDURE — 99214 OFFICE O/P EST MOD 30 MIN: CPT | Performed by: INTERNAL MEDICINE

## 2021-04-28 RX ORDER — LISINOPRIL 10 MG/1
10 TABLET ORAL DAILY
Qty: 30 TABLET | Refills: 11 | Status: ON HOLD | OUTPATIENT
Start: 2021-04-28 | End: 2021-04-30

## 2021-04-28 NOTE — PROGRESS NOTES
Reason for Visit: Chest pain.    HPI:  Rin Soler is a 56 y.o. female is here today for follow-up of worsening chest pain in her right upper chest in the region of her lung mass.  Her lung mass has enlarged based on the CT scan in April.  The pain has been has been worsening over the past couple of months.  It happens intermittently and seems to be random.  It is more sharp in quality and makes it hard for her to breath.  The pain radiates into her back.  She also reports pain in the right side of her neck.  She feels cold much of the time, particularly in her legs.  Her blood pressure has been very high but she is not sure when it started getting high.  She is scheduled to get a biopsy of     Previous Cardiac Testing and Procedures:  - Stress echo (08/04/2015) negative for ischemia  - Lipid panel (09/03/2015) 166/47/93/130  - LHC (10/05/2015) 90% mid RCA stenosis status post PCI with 2.25 x 12 mm Xience Alpine IAN, no other coronary artery disease, EF 55% with mild inferior hypokinesis  - Stress echo (03/29/2016) low risk for ischemia, good exercise capacity  - Echo (12/22/2016) EF 60-65%, normal RV size and function, normal diastolic function, all valves structurally and functionally normal  - VESTA (12/22/2016) normal arterial exam of the lower extremities  - Holter monitor (12/22/2016) sinus rhythm with rare atrial ventricular ectopic beats, no significant pauses, arrhythmias, or events, symptoms associated with sinus tachycardia  - Lipid panel (10/3/2017) total cholesterol 106, HDL 53, LDL 31, triglycerides 110  - Exercise nuclear stress (3/13/2019) negative for ischemia, EF 78%  - CT chest (4/9/2021) 4.9 x 2.9 x 5.8 uh right upper lobe lung mass abutting the medial pleura with direct extension into the right hilum and suspected mediastinal invasion with abutment of the IVC and aortic arch and encasement of the right upper lobe pulmonary arteries and abutment of the right upper lobe bronchus, no definite  metastatic disease of the chest, severe emphysema  - Creatinine (4/12/2021) 0.7    Patient Active Problem List   Diagnosis   • Dysphagia   • GERD (gastroesophageal reflux disease)   • Tobacco abuse   • Coronary artery disease involving native coronary artery of native heart with angina pectoris (CMS/[facility])   • Dyslipidemia   • Essential hypertension   • COPD (chronic obstructive pulmonary disease) (CMS/[facility])   • Lung cancer (CMS/[facility])   • Lung mass       Social History     Tobacco Use   • Smoking status: Current Every Day Smoker     Packs/day: 1.50     Years: 20.00     Pack years: 30.00     Types: Cigarettes     Start date: 1976   • Smokeless tobacco: Never Used   Substance Use Topics   • Alcohol use: No   • Drug use: No       Family History   Problem Relation Age of Onset   • Diabetes Mother    • Cancer Father    • Heart disease Father        The following portions of the patient's history were reviewed and updated as appropriate: allergies, current medications, past family history, past medical history, past social history, past surgical history and problem list.      Current Outpatient Medications:   •  aspirin 81 MG EC tablet, Take 81 mg by mouth Daily., Disp: , Rfl:   •  atorvastatin (LIPITOR) 20 MG tablet, Take 1 tablet by mouth Daily., Disp: 30 tablet, Rfl: 11  •  diazePAM (VALIUM) 10 MG tablet, TK 1 T PO TID PRN, Disp: , Rfl: 1  •  HYDROcodone-acetaminophen (NORCO) 7.5-325 MG per tablet, TK 1 T PO Q 6 H, Disp: , Rfl: 0  •  isosorbide mononitrate (IMDUR) 30 MG 24 hr tablet, Take 1 tablet by mouth Daily., Disp: 30 tablet, Rfl: 11  •  metoprolol tartrate (LOPRESSOR) 25 MG tablet, Take 1 tablet by mouth 2 (Two) Times a Day., Disp: 60 tablet, Rfl: 11  •  pantoprazole (PROTONIX) 40 MG EC tablet, TK 1 T PO BID, Disp: , Rfl: 11  •  lisinopril (PRINIVIL,ZESTRIL) 10 MG tablet, Take 1 tablet by mouth Daily., Disp: 30 tablet, Rfl: 11  •  nitroglycerin (NITROSTAT) 0.4 MG SL tablet, Place 1 tablet under the tongue Every 5  "(Five) Minutes As Needed for Chest Pain. Take no more than 3 doses in 15 minutes., Disp: 25 tablet, Rfl: 5    Review of Systems   Constitutional: Positive for chills and malaise/fatigue. Negative for fever.   Cardiovascular: Positive for chest pain, dyspnea on exertion, irregular heartbeat, leg swelling and palpitations. Negative for paroxysmal nocturnal dyspnea.   Respiratory: Positive for shortness of breath. Negative for cough.    Endocrine: Positive for cold intolerance.   Skin: Negative for rash.   Musculoskeletal: Positive for back pain, joint pain and neck pain.   Gastrointestinal: Negative for abdominal pain and heartburn.   Neurological: Negative for dizziness and numbness.   Psychiatric/Behavioral: Positive for depression. The patient is nervous/anxious.        Objective   BP (!) 190/96 (BP Location: Left arm, Patient Position: Sitting, Cuff Size: Adult)   Pulse 79   Ht 170 cm (66.93\")   Wt 31.3 kg (69 lb)   SpO2 97%   BMI 10.83 kg/m²   Constitutional:       Appearance: Well-developed and underweight. Frail.   HENT:      Head: Normocephalic and atraumatic.   Pulmonary:      Effort: Pulmonary effort is normal.      Breath sounds: Normal breath sounds.   Cardiovascular:      Normal rate. Regular rhythm.      Murmurs: There is no murmur.      No gallop. No click.   Edema:     Peripheral edema absent.   Skin:     General: Skin is warm and dry.   Neurological:      Mental Status: Alert and oriented to person, place, and time.   Psychiatric:         Mood and Affect: Mood is anxious. Affect is tearful.         ECG 12 Lead    Date/Time: 4/28/2021 11:26 AM  Performed by: Mike Cunningham MD  Authorized by: Mike Cunningham MD   Comparison: compared with previous ECG from 8/26/2019  Similar to previous ECG  Rhythm: sinus rhythm  Rate: normal  Other findings: non-specific ST-T wave changes              ICD-10-CM ICD-9-CM   1. Coronary artery disease involving native coronary artery of native heart with angina " pectoris (CMS/formerly [facility])  I25.119 414.01     413.9   2. Tobacco abuse  Z72.0 305.1   3. Essential hypertension  I10 401.9   4. Dyslipidemia  E78.5 272.4   5. Lung mass  R91.8 786.6         Assessment/Plan:  1. Coronary artery disease: Status post PCI to RCA 10/2015.  Negative stress echo 3/2016 and negative nuclear stress 3/2019.  Current chest pain symptoms are felt to be secondary to her large invasive lung mass.  Continue aspirin, atorvastatin, Imdur, metoprolol, and nitroglycerin.       2.  Tobacco abuse: Rin Soler  reports that she has been smoking cigarettes. She started smoking about 45 years ago. She has a 30.00 pack-year smoking history. She has never used smokeless tobacco.. I have educated her on the risk of diseases from using tobacco products such as cancer, COPD and heart disease.  I advised her to quit and she is not willing to quit.  I spent 5 minutes counseling the patient.     3.  Dyslipidemia: Managed on atorvastatin.       4.  Essential hypertension:  Blood pressure  is significantly elevated today.  Her extreme anxiety and stress related to her lung cancer diagnosis and pending biopsy tomorrow likely worsening her blood pressure.  Continue metoprolol and start lisinopril.    5.  Lung mass: Large right upper lobe lung mass invading local structures and great vessels.  Has biopsy tomorrow which is making patient extremely anxious.  Encouraged her to proceed with the biopsy.

## 2021-04-28 NOTE — TELEPHONE ENCOUNTER
Called and spoke with Ms. Soler. Advised her to take BP medication as Dr. Cunningham prescribed. Needle biopsy on for tomorrow. Provided support and encouragement. She is very nervous.

## 2021-04-28 NOTE — H&P (VIEW-ONLY)
Reason for Visit: Chest pain.    HPI:  Rin Soler is a 56 y.o. female is here today for follow-up of worsening chest pain in her right upper chest in the region of her lung mass.  Her lung mass has enlarged based on the CT scan in April.  The pain has been has been worsening over the past couple of months.  It happens intermittently and seems to be random.  It is more sharp in quality and makes it hard for her to breath.  The pain radiates into her back.  She also reports pain in the right side of her neck.  She feels cold much of the time, particularly in her legs.  Her blood pressure has been very high but she is not sure when it started getting high.  She is scheduled to get a biopsy of     Previous Cardiac Testing and Procedures:  - Stress echo (08/04/2015) negative for ischemia  - Lipid panel (09/03/2015) 166/47/93/130  - LHC (10/05/2015) 90% mid RCA stenosis status post PCI with 2.25 x 12 mm Xience Alpine IAN, no other coronary artery disease, EF 55% with mild inferior hypokinesis  - Stress echo (03/29/2016) low risk for ischemia, good exercise capacity  - Echo (12/22/2016) EF 60-65%, normal RV size and function, normal diastolic function, all valves structurally and functionally normal  - VESTA (12/22/2016) normal arterial exam of the lower extremities  - Holter monitor (12/22/2016) sinus rhythm with rare atrial ventricular ectopic beats, no significant pauses, arrhythmias, or events, symptoms associated with sinus tachycardia  - Lipid panel (10/3/2017) total cholesterol 106, HDL 53, LDL 31, triglycerides 110  - Exercise nuclear stress (3/13/2019) negative for ischemia, EF 78%  - CT chest (4/9/2021) 4.9 x 2.9 x 5.8 uh right upper lobe lung mass abutting the medial pleura with direct extension into the right hilum and suspected mediastinal invasion with abutment of the IVC and aortic arch and encasement of the right upper lobe pulmonary arteries and abutment of the right upper lobe bronchus, no definite  metastatic disease of the chest, severe emphysema  - Creatinine (4/12/2021) 0.7    Patient Active Problem List   Diagnosis   • Dysphagia   • GERD (gastroesophageal reflux disease)   • Tobacco abuse   • Coronary artery disease involving native coronary artery of native heart with angina pectoris (CMS/[facility])   • Dyslipidemia   • Essential hypertension   • COPD (chronic obstructive pulmonary disease) (CMS/[facility])   • Lung cancer (CMS/[facility])   • Lung mass       Social History     Tobacco Use   • Smoking status: Current Every Day Smoker     Packs/day: 1.50     Years: 20.00     Pack years: 30.00     Types: Cigarettes     Start date: 1976   • Smokeless tobacco: Never Used   Substance Use Topics   • Alcohol use: No   • Drug use: No       Family History   Problem Relation Age of Onset   • Diabetes Mother    • Cancer Father    • Heart disease Father        The following portions of the patient's history were reviewed and updated as appropriate: allergies, current medications, past family history, past medical history, past social history, past surgical history and problem list.      Current Outpatient Medications:   •  aspirin 81 MG EC tablet, Take 81 mg by mouth Daily., Disp: , Rfl:   •  atorvastatin (LIPITOR) 20 MG tablet, Take 1 tablet by mouth Daily., Disp: 30 tablet, Rfl: 11  •  diazePAM (VALIUM) 10 MG tablet, TK 1 T PO TID PRN, Disp: , Rfl: 1  •  HYDROcodone-acetaminophen (NORCO) 7.5-325 MG per tablet, TK 1 T PO Q 6 H, Disp: , Rfl: 0  •  isosorbide mononitrate (IMDUR) 30 MG 24 hr tablet, Take 1 tablet by mouth Daily., Disp: 30 tablet, Rfl: 11  •  metoprolol tartrate (LOPRESSOR) 25 MG tablet, Take 1 tablet by mouth 2 (Two) Times a Day., Disp: 60 tablet, Rfl: 11  •  pantoprazole (PROTONIX) 40 MG EC tablet, TK 1 T PO BID, Disp: , Rfl: 11  •  lisinopril (PRINIVIL,ZESTRIL) 10 MG tablet, Take 1 tablet by mouth Daily., Disp: 30 tablet, Rfl: 11  •  nitroglycerin (NITROSTAT) 0.4 MG SL tablet, Place 1 tablet under the tongue Every 5  "(Five) Minutes As Needed for Chest Pain. Take no more than 3 doses in 15 minutes., Disp: 25 tablet, Rfl: 5    Review of Systems   Constitutional: Positive for chills and malaise/fatigue. Negative for fever.   Cardiovascular: Positive for chest pain, dyspnea on exertion, irregular heartbeat, leg swelling and palpitations. Negative for paroxysmal nocturnal dyspnea.   Respiratory: Positive for shortness of breath. Negative for cough.    Endocrine: Positive for cold intolerance.   Skin: Negative for rash.   Musculoskeletal: Positive for back pain, joint pain and neck pain.   Gastrointestinal: Negative for abdominal pain and heartburn.   Neurological: Negative for dizziness and numbness.   Psychiatric/Behavioral: Positive for depression. The patient is nervous/anxious.        Objective   BP (!) 190/96 (BP Location: Left arm, Patient Position: Sitting, Cuff Size: Adult)   Pulse 79   Ht 170 cm (66.93\")   Wt 31.3 kg (69 lb)   SpO2 97%   BMI 10.83 kg/m²   Constitutional:       Appearance: Well-developed and underweight. Frail.   HENT:      Head: Normocephalic and atraumatic.   Pulmonary:      Effort: Pulmonary effort is normal.      Breath sounds: Normal breath sounds.   Cardiovascular:      Normal rate. Regular rhythm.      Murmurs: There is no murmur.      No gallop. No click.   Edema:     Peripheral edema absent.   Skin:     General: Skin is warm and dry.   Neurological:      Mental Status: Alert and oriented to person, place, and time.   Psychiatric:         Mood and Affect: Mood is anxious. Affect is tearful.         ECG 12 Lead    Date/Time: 4/28/2021 11:26 AM  Performed by: Mike Cunningham MD  Authorized by: Mike Cunningham MD   Comparison: compared with previous ECG from 8/26/2019  Similar to previous ECG  Rhythm: sinus rhythm  Rate: normal  Other findings: non-specific ST-T wave changes              ICD-10-CM ICD-9-CM   1. Coronary artery disease involving native coronary artery of native heart with angina " pectoris (CMS/[facility])  I25.119 414.01     413.9   2. Tobacco abuse  Z72.0 305.1   3. Essential hypertension  I10 401.9   4. Dyslipidemia  E78.5 272.4   5. Lung mass  R91.8 786.6         Assessment/Plan:  1. Coronary artery disease: Status post PCI to RCA 10/2015.  Negative stress echo 3/2016 and negative nuclear stress 3/2019.  Current chest pain symptoms are felt to be secondary to her large invasive lung mass.  Continue aspirin, atorvastatin, Imdur, metoprolol, and nitroglycerin.       2.  Tobacco abuse: Rin Soler  reports that she has been smoking cigarettes. She started smoking about 45 years ago. She has a 30.00 pack-year smoking history. She has never used smokeless tobacco.. I have educated her on the risk of diseases from using tobacco products such as cancer, COPD and heart disease.  I advised her to quit and she is not willing to quit.  I spent 5 minutes counseling the patient.     3.  Dyslipidemia: Managed on atorvastatin.       4.  Essential hypertension:  Blood pressure  is significantly elevated today.  Her extreme anxiety and stress related to her lung cancer diagnosis and pending biopsy tomorrow likely worsening her blood pressure.  Continue metoprolol and start lisinopril.    5.  Lung mass: Large right upper lobe lung mass invading local structures and great vessels.  Has biopsy tomorrow which is making patient extremely anxious.  Encouraged her to proceed with the biopsy.

## 2021-04-28 NOTE — TELEPHONE ENCOUNTER
"Pt left vm message requesting a call back ASAP.  States she is sched for bx in the morning but states she has some concerns.  States she saw Dr Cunningham today and her BP is \"running dangerously high\".  Pt states we may need to cancel.  Requesting to speak with someone today.  Can reach her at #[phone]/rohit  "

## 2021-04-29 ENCOUNTER — HOSPITAL ENCOUNTER (OUTPATIENT)
Dept: GENERAL RADIOLOGY | Facility: HOSPITAL | Age: 56
Discharge: HOME | End: 2021-04-29

## 2021-04-29 ENCOUNTER — APPOINTMENT (OUTPATIENT)
Dept: GENERAL RADIOLOGY | Facility: HOSPITAL | Age: 56
End: 2021-04-29

## 2021-04-29 ENCOUNTER — HOSPITAL ENCOUNTER (INPATIENT)
Facility: HOSPITAL | Age: 56
LOS: 8 days | Discharge: HOME | End: 2021-05-07
Attending: EMERGENCY MEDICINE | Admitting: THORACIC SURGERY (CARDIOTHORACIC VASCULAR SURGERY)

## 2021-04-29 ENCOUNTER — HOSPITAL ENCOUNTER (OUTPATIENT)
Dept: CT IMAGING | Facility: HOSPITAL | Age: 56
Discharge: HOME | End: 2021-04-29

## 2021-04-29 VITALS
RESPIRATION RATE: 20 BRPM | TEMPERATURE: 96.8 F | HEART RATE: 90 BPM | SYSTOLIC BLOOD PRESSURE: 158 MMHG | WEIGHT: 68.6 LBS | BODY MASS INDEX: 12.95 KG/M2 | HEIGHT: 61 IN | OXYGEN SATURATION: 94 % | DIASTOLIC BLOOD PRESSURE: 96 MMHG

## 2021-04-29 DIAGNOSIS — Z74.09 IMPAIRED MOBILITY: ICD-10-CM

## 2021-04-29 DIAGNOSIS — R91.8 LUNG MASS: ICD-10-CM

## 2021-04-29 DIAGNOSIS — J95.811 POSTPROCEDURAL PNEUMOTHORAX: ICD-10-CM

## 2021-04-29 DIAGNOSIS — S27.0XXA TRAUMATIC PNEUMOTHORAX, INITIAL ENCOUNTER: Primary | ICD-10-CM

## 2021-04-29 LAB
APTT PPP: 30.3 SECONDS (ref 24.1–35)
INR PPP: 1.08 (ref 0.91–1.09)
PLATELET # BLD AUTO: 204 10*3/MM3 (ref 140–450)
PROTHROMBIN TIME: 13.2 SECONDS (ref 11.5–13.4)
SARS-COV-2 RDRP RESP QL NAA+PROBE: NORMAL

## 2021-04-29 PROCEDURE — 85730 THROMBOPLASTIN TIME PARTIAL: CPT | Performed by: RADIOLOGY

## 2021-04-29 PROCEDURE — 87635 SARS-COV-2 COVID-19 AMP PRB: CPT | Performed by: EMERGENCY MEDICINE

## 2021-04-29 PROCEDURE — 88342 IMHCHEM/IMCYTCHM 1ST ANTB: CPT | Performed by: NURSE PRACTITIONER

## 2021-04-29 PROCEDURE — 71045 X-RAY EXAM CHEST 1 VIEW: CPT

## 2021-04-29 PROCEDURE — 99152 MOD SED SAME PHYS/QHP 5/>YRS: CPT

## 2021-04-29 PROCEDURE — 81210 BRAF GENE: CPT

## 2021-04-29 PROCEDURE — 25010000003 LIDOCAINE 1 % SOLUTION: Performed by: EMERGENCY MEDICINE

## 2021-04-29 PROCEDURE — 25010000002 FENTANYL CITRATE (PF) 100 MCG/2ML SOLUTION: Performed by: EMERGENCY MEDICINE

## 2021-04-29 PROCEDURE — 88360 TUMOR IMMUNOHISTOCHEM/MANUAL: CPT

## 2021-04-29 PROCEDURE — 85610 PROTHROMBIN TIME: CPT | Performed by: RADIOLOGY

## 2021-04-29 PROCEDURE — 25010000002 MORPHINE SULFATE (PF) 2 MG/ML SOLUTION: Performed by: THORACIC SURGERY (CARDIOTHORACIC VASCULAR SURGERY)

## 2021-04-29 PROCEDURE — 88377 M/PHMTRC ALYS ISHQUANT/SEMIQ: CPT

## 2021-04-29 PROCEDURE — 25010000002 PROPOFOL 10 MG/ML EMULSION: Performed by: EMERGENCY MEDICINE

## 2021-04-29 PROCEDURE — 88341 IMHCHEM/IMCYTCHM EA ADD ANTB: CPT | Performed by: NURSE PRACTITIONER

## 2021-04-29 PROCEDURE — 88172 CYTP DX EVAL FNA 1ST EA SITE: CPT | Performed by: NURSE PRACTITIONER

## 2021-04-29 PROCEDURE — 88305 TISSUE EXAM BY PATHOLOGIST: CPT | Performed by: NURSE PRACTITIONER

## 2021-04-29 PROCEDURE — 85049 AUTOMATED PLATELET COUNT: CPT | Performed by: RADIOLOGY

## 2021-04-29 PROCEDURE — 25010000003 HYDROMORPHONE 1 MG/ML SOLUTION: Performed by: EMERGENCY MEDICINE

## 2021-04-29 PROCEDURE — 99284 EMERGENCY DEPT VISIT MOD MDM: CPT

## 2021-04-29 PROCEDURE — 81235 EGFR GENE COM VARIANTS: CPT

## 2021-04-29 RX ORDER — SODIUM CHLORIDE 0.9 % (FLUSH) 0.9 %
10 SYRINGE (ML) INJECTION EVERY 12 HOURS SCHEDULED
Status: DISCONTINUED | OUTPATIENT
Start: 2021-04-29 | End: 2021-05-07 | Stop reason: HOSPADM

## 2021-04-29 RX ORDER — NITROGLYCERIN 0.4 MG/1
0.4 TABLET SUBLINGUAL
Status: DISCONTINUED | OUTPATIENT
Start: 2021-04-29 | End: 2021-05-07 | Stop reason: HOSPADM

## 2021-04-29 RX ORDER — LABETALOL HYDROCHLORIDE 5 MG/ML
10 INJECTION, SOLUTION INTRAVENOUS ONCE
Status: COMPLETED | OUTPATIENT
Start: 2021-04-29 | End: 2021-04-29

## 2021-04-29 RX ORDER — ATORVASTATIN CALCIUM 10 MG/1
20 TABLET, FILM COATED ORAL NIGHTLY
Status: DISCONTINUED | OUTPATIENT
Start: 2021-04-29 | End: 2021-05-07 | Stop reason: HOSPADM

## 2021-04-29 RX ORDER — ASPIRIN 81 MG/1
81 TABLET ORAL DAILY
Status: DISCONTINUED | OUTPATIENT
Start: 2021-04-30 | End: 2021-04-29

## 2021-04-29 RX ORDER — KETAMINE HYDROCHLORIDE 50 MG/ML
100 INJECTION, SOLUTION, CONCENTRATE INTRAMUSCULAR; INTRAVENOUS ONCE
Status: COMPLETED | OUTPATIENT
Start: 2021-04-29 | End: 2021-04-29

## 2021-04-29 RX ORDER — PANTOPRAZOLE SODIUM 40 MG/1
40 TABLET, DELAYED RELEASE ORAL 2 TIMES DAILY
Status: DISCONTINUED | OUTPATIENT
Start: 2021-04-29 | End: 2021-05-07 | Stop reason: HOSPADM

## 2021-04-29 RX ORDER — SODIUM CHLORIDE 0.9 % (FLUSH) 0.9 %
3 SYRINGE (ML) INJECTION EVERY 12 HOURS SCHEDULED
Status: DISCONTINUED | OUTPATIENT
Start: 2021-04-29 | End: 2021-04-30 | Stop reason: HOSPADM

## 2021-04-29 RX ORDER — SODIUM CHLORIDE 0.9 % (FLUSH) 0.9 %
10 SYRINGE (ML) INJECTION AS NEEDED
Status: DISCONTINUED | OUTPATIENT
Start: 2021-04-29 | End: 2021-05-07 | Stop reason: HOSPADM

## 2021-04-29 RX ORDER — PROPOFOL 10 MG/ML
100 VIAL (ML) INTRAVENOUS ONCE
Status: COMPLETED | OUTPATIENT
Start: 2021-04-29 | End: 2021-04-29

## 2021-04-29 RX ORDER — DIAZEPAM 5 MG/1
5 TABLET ORAL EVERY 8 HOURS PRN
Status: DISCONTINUED | OUTPATIENT
Start: 2021-04-29 | End: 2021-05-07 | Stop reason: HOSPADM

## 2021-04-29 RX ORDER — LISINOPRIL 10 MG/1
10 TABLET ORAL DAILY
Status: DISCONTINUED | OUTPATIENT
Start: 2021-04-30 | End: 2021-05-01

## 2021-04-29 RX ORDER — ASPIRIN 81 MG/1
81 TABLET ORAL DAILY
Status: DISCONTINUED | OUTPATIENT
Start: 2021-04-29 | End: 2021-05-07 | Stop reason: HOSPADM

## 2021-04-29 RX ORDER — ISOSORBIDE MONONITRATE 30 MG/1
30 TABLET, EXTENDED RELEASE ORAL DAILY
Status: DISCONTINUED | OUTPATIENT
Start: 2021-04-30 | End: 2021-04-29

## 2021-04-29 RX ORDER — LIDOCAINE HYDROCHLORIDE 10 MG/ML
10 INJECTION, SOLUTION INFILTRATION; PERINEURAL ONCE
Status: COMPLETED | OUTPATIENT
Start: 2021-04-29 | End: 2021-04-29

## 2021-04-29 RX ORDER — SODIUM CHLORIDE 0.9 % (FLUSH) 0.9 %
10 SYRINGE (ML) INJECTION AS NEEDED
Status: DISCONTINUED | OUTPATIENT
Start: 2021-04-29 | End: 2021-04-30 | Stop reason: HOSPADM

## 2021-04-29 RX ORDER — ISOSORBIDE MONONITRATE 30 MG/1
30 TABLET, EXTENDED RELEASE ORAL NIGHTLY
Status: DISCONTINUED | OUTPATIENT
Start: 2021-04-29 | End: 2021-05-07 | Stop reason: HOSPADM

## 2021-04-29 RX ORDER — ATORVASTATIN CALCIUM 10 MG/1
20 TABLET, FILM COATED ORAL DAILY
Status: DISCONTINUED | OUTPATIENT
Start: 2021-04-30 | End: 2021-04-29

## 2021-04-29 RX ORDER — HYDROCODONE BITARTRATE AND ACETAMINOPHEN 7.5; 325 MG/1; MG/1
1 TABLET ORAL EVERY 6 HOURS PRN
Status: DISCONTINUED | OUTPATIENT
Start: 2021-04-29 | End: 2021-05-03

## 2021-04-29 RX ORDER — MORPHINE SULFATE 2 MG/ML
2 INJECTION, SOLUTION INTRAMUSCULAR; INTRAVENOUS
Status: DISCONTINUED | OUTPATIENT
Start: 2021-04-29 | End: 2021-05-02

## 2021-04-29 RX ORDER — FENTANYL CITRATE 50 UG/ML
25 INJECTION, SOLUTION INTRAMUSCULAR; INTRAVENOUS ONCE
Status: COMPLETED | OUTPATIENT
Start: 2021-04-29 | End: 2021-04-29

## 2021-04-29 RX ADMIN — FENTANYL CITRATE 25 MCG: 50 INJECTION, SOLUTION INTRAMUSCULAR; INTRAVENOUS at 18:23

## 2021-04-29 RX ADMIN — KETAMINE HYDROCHLORIDE 50 MG: 50 INJECTION, SOLUTION INTRAMUSCULAR; INTRAVENOUS at 18:05

## 2021-04-29 RX ADMIN — DIAZEPAM 5 MG: 5 TABLET ORAL at 22:37

## 2021-04-29 RX ADMIN — FENTANYL CITRATE 25 MCG: 50 INJECTION, SOLUTION INTRAMUSCULAR; INTRAVENOUS at 18:52

## 2021-04-29 RX ADMIN — METOPROLOL TARTRATE 25 MG: 25 TABLET, FILM COATED ORAL at 22:37

## 2021-04-29 RX ADMIN — LABETALOL HYDROCHLORIDE 10 MG: 5 INJECTION, SOLUTION INTRAVENOUS at 18:52

## 2021-04-29 RX ADMIN — LIDOCAINE HYDROCHLORIDE 10 ML: 10 INJECTION, SOLUTION INFILTRATION; PERINEURAL at 18:06

## 2021-04-29 RX ADMIN — PROPOFOL 25 MG: 10 INJECTION, EMULSION INTRAVENOUS at 18:05

## 2021-04-29 RX ADMIN — LABETALOL HYDROCHLORIDE 10 MG: 5 INJECTION, SOLUTION INTRAVENOUS at 18:24

## 2021-04-29 RX ADMIN — SODIUM CHLORIDE, PRESERVATIVE FREE 3 ML: 5 INJECTION INTRAVENOUS at 10:07

## 2021-04-29 RX ADMIN — MORPHINE SULFATE 2 MG: 2 INJECTION, SOLUTION INTRAMUSCULAR; INTRAVENOUS at 21:54

## 2021-04-29 RX ADMIN — HYDROMORPHONE HYDROCHLORIDE 1 MG: 1 INJECTION, SOLUTION INTRAMUSCULAR; INTRAVENOUS; SUBCUTANEOUS at 19:43

## 2021-04-29 NOTE — NURSING NOTE
Called in to patients room at this time.  Pt states she was repositioning herself in bed and her chest tube came out.  Dressing remains CDI, no s/so of distress noted at this time.  O2 sats remain in the upper 90's with 3L NC.  Dr MASTER Owens also notified at this time

## 2021-04-29 NOTE — NURSING NOTE
Dr MASTER Owens at bedside speaking with pt and family at this time.  MD spoke with them about risks of not replacing chest tube at this time.  Pt states she would like to try to be able to go home today.  MD and pt spoke at length about risks of pt going home with a partial pneumo at this time.  MD explained pt could completely collapse lung and not be able to return to the hospital in time to be taken care of.  Pt states she understands risks at this time.  At this time MD and pt agreed to try and see how pt will do off of oxygen, repeat xray and re-evaluate later in the afternoon.  MD spoke with pt and family from [phone].

## 2021-04-29 NOTE — NURSING NOTE
No c/o pain or s/s of distress noted at this time.  Pt c/o shortness of breath only when talking with family member in room.

## 2021-04-29 NOTE — NURSING NOTE
PT placed on room air at this time for trial to see if she will be able to d/c home later this afternoon.

## 2021-04-29 NOTE — INTERVAL H&P NOTE
H&P reviewed. The patient was examined and there are no changes to the H&P.      Risk, Benefits, and Alternatives discussed with the patient.  History and Physical reviewed, and no changes.  Plan is for moderate sedation, and the patient agrees to proceed with procedure.

## 2021-04-29 NOTE — NURSING NOTE
Dr MASTER Owens states at this time that pt needs to be transferred to ER for further evaluation and chest tube placement. Will inform patient

## 2021-04-29 NOTE — NURSING NOTE
This RN spoke with patient at length about need to transfer to ER for further care at this time.  Pt is tearful and afraid, but agreeable to transfer.  Emotional support and prayer offered to patient at this time.

## 2021-04-29 NOTE — NURSING NOTE
Pt's 02 saturations dropping 83-85% on room air.  Pt states she feels short of breath when talking with family member.  CXR repeated and Dr. MASTER Owens notified of pt decline in o2 saturations at this time.

## 2021-04-30 ENCOUNTER — TELEPHONE (OUTPATIENT)
Dept: HEMATOLOGY | Age: 56
End: 2021-04-30

## 2021-04-30 ENCOUNTER — APPOINTMENT (OUTPATIENT)
Dept: GENERAL RADIOLOGY | Facility: HOSPITAL | Age: 56
End: 2021-04-30

## 2021-04-30 PROBLEM — E43 SEVERE MALNUTRITION (HCC): Status: ACTIVE | Noted: 2021-04-30

## 2021-04-30 PROCEDURE — 32551 INSERTION OF CHEST TUBE: CPT | Performed by: THORACIC SURGERY (CARDIOTHORACIC VASCULAR SURGERY)

## 2021-04-30 PROCEDURE — 0W9930Z DRAINAGE OF RIGHT PLEURAL CAVITY WITH DRAINAGE DEVICE, PERCUTANEOUS APPROACH: ICD-10-PCS | Performed by: THORACIC SURGERY (CARDIOTHORACIC VASCULAR SURGERY)

## 2021-04-30 PROCEDURE — 25010000002 MORPHINE SULFATE (PF) 2 MG/ML SOLUTION: Performed by: THORACIC SURGERY (CARDIOTHORACIC VASCULAR SURGERY)

## 2021-04-30 PROCEDURE — 25010000002 ENOXAPARIN PER 10 MG: Performed by: THORACIC SURGERY (CARDIOTHORACIC VASCULAR SURGERY)

## 2021-04-30 PROCEDURE — 71045 X-RAY EXAM CHEST 1 VIEW: CPT

## 2021-04-30 PROCEDURE — 99222 1ST HOSP IP/OBS MODERATE 55: CPT | Performed by: NURSE PRACTITIONER

## 2021-04-30 RX ORDER — LIDOCAINE HYDROCHLORIDE AND EPINEPHRINE 10; 10 MG/ML; UG/ML
5 INJECTION, SOLUTION INFILTRATION; PERINEURAL ONCE AS NEEDED
Status: DISCONTINUED | OUTPATIENT
Start: 2021-04-30 | End: 2021-05-07 | Stop reason: HOSPADM

## 2021-04-30 RX ORDER — LABETALOL HYDROCHLORIDE 5 MG/ML
10 INJECTION, SOLUTION INTRAVENOUS ONCE
Status: COMPLETED | OUTPATIENT
Start: 2021-04-30 | End: 2021-04-30

## 2021-04-30 RX ORDER — LIDOCAINE 50 MG/G
1 PATCH TOPICAL
Status: DISCONTINUED | OUTPATIENT
Start: 2021-04-30 | End: 2021-05-07 | Stop reason: HOSPADM

## 2021-04-30 RX ORDER — LISINOPRIL 10 MG/1
10 TABLET ORAL ONCE
Status: DISCONTINUED | OUTPATIENT
Start: 2021-04-30 | End: 2021-05-03

## 2021-04-30 RX ORDER — LIDOCAINE HYDROCHLORIDE AND EPINEPHRINE BITARTRATE 20; .01 MG/ML; MG/ML
5 INJECTION, SOLUTION SUBCUTANEOUS ONCE
Status: COMPLETED | OUTPATIENT
Start: 2021-04-30 | End: 2021-04-30

## 2021-04-30 RX ORDER — METOPROLOL TARTRATE 5 MG/5ML
5 INJECTION INTRAVENOUS ONCE
Status: COMPLETED | OUTPATIENT
Start: 2021-04-30 | End: 2021-04-30

## 2021-04-30 RX ORDER — ENALAPRILAT 2.5 MG/2ML
1.25 INJECTION INTRAVENOUS ONCE
Status: COMPLETED | OUTPATIENT
Start: 2021-04-30 | End: 2021-04-30

## 2021-04-30 RX ADMIN — LISINOPRIL 10 MG: 10 TABLET ORAL at 08:09

## 2021-04-30 RX ADMIN — MORPHINE SULFATE 2 MG: 2 INJECTION, SOLUTION INTRAMUSCULAR; INTRAVENOUS at 02:30

## 2021-04-30 RX ADMIN — PANTOPRAZOLE SODIUM 40 MG: 40 TABLET, DELAYED RELEASE ORAL at 00:01

## 2021-04-30 RX ADMIN — ISOSORBIDE MONONITRATE 30 MG: 30 TABLET, EXTENDED RELEASE ORAL at 00:01

## 2021-04-30 RX ADMIN — DIAZEPAM 5 MG: 5 TABLET ORAL at 23:27

## 2021-04-30 RX ADMIN — ENALAPRILAT 1.25 MG: 1.25 INJECTION INTRAVENOUS at 09:15

## 2021-04-30 RX ADMIN — SODIUM CHLORIDE, PRESERVATIVE FREE 10 ML: 5 INJECTION INTRAVENOUS at 08:09

## 2021-04-30 RX ADMIN — MORPHINE SULFATE 2 MG: 2 INJECTION, SOLUTION INTRAMUSCULAR; INTRAVENOUS at 05:00

## 2021-04-30 RX ADMIN — ISOSORBIDE MONONITRATE 30 MG: 30 TABLET, EXTENDED RELEASE ORAL at 23:28

## 2021-04-30 RX ADMIN — ENOXAPARIN SODIUM 30 MG: 30 INJECTION SUBCUTANEOUS at 00:02

## 2021-04-30 RX ADMIN — PANTOPRAZOLE SODIUM 40 MG: 40 TABLET, DELAYED RELEASE ORAL at 08:09

## 2021-04-30 RX ADMIN — METOPROLOL TARTRATE 25 MG: 25 TABLET, FILM COATED ORAL at 08:09

## 2021-04-30 RX ADMIN — ATORVASTATIN CALCIUM 20 MG: 10 TABLET, FILM COATED ORAL at 00:01

## 2021-04-30 RX ADMIN — ENOXAPARIN SODIUM 30 MG: 30 INJECTION SUBCUTANEOUS at 08:08

## 2021-04-30 RX ADMIN — MORPHINE SULFATE 2 MG: 2 INJECTION, SOLUTION INTRAMUSCULAR; INTRAVENOUS at 11:46

## 2021-04-30 RX ADMIN — PANTOPRAZOLE SODIUM 40 MG: 40 TABLET, DELAYED RELEASE ORAL at 23:28

## 2021-04-30 RX ADMIN — LABETALOL HYDROCHLORIDE 10 MG: 5 INJECTION, SOLUTION INTRAVENOUS at 00:49

## 2021-04-30 RX ADMIN — METOPROLOL TARTRATE 25 MG: 25 TABLET, FILM COATED ORAL at 23:28

## 2021-04-30 RX ADMIN — METOPROLOL TARTRATE 5 MG: 5 INJECTION INTRAVENOUS at 11:43

## 2021-04-30 RX ADMIN — LIDOCAINE 1 PATCH: 50 PATCH CUTANEOUS at 15:54

## 2021-04-30 RX ADMIN — ATORVASTATIN CALCIUM 20 MG: 10 TABLET, FILM COATED ORAL at 23:28

## 2021-04-30 RX ADMIN — MORPHINE SULFATE 2 MG: 2 INJECTION, SOLUTION INTRAMUSCULAR; INTRAVENOUS at 20:00

## 2021-04-30 RX ADMIN — MORPHINE SULFATE 2 MG: 2 INJECTION, SOLUTION INTRAMUSCULAR; INTRAVENOUS at 00:01

## 2021-04-30 RX ADMIN — MORPHINE SULFATE 2 MG: 2 INJECTION, SOLUTION INTRAMUSCULAR; INTRAVENOUS at 18:03

## 2021-04-30 RX ADMIN — ENOXAPARIN SODIUM 30 MG: 30 INJECTION SUBCUTANEOUS at 23:28

## 2021-04-30 RX ADMIN — MORPHINE SULFATE 2 MG: 2 INJECTION, SOLUTION INTRAMUSCULAR; INTRAVENOUS at 23:25

## 2021-04-30 RX ADMIN — ASPIRIN 81 MG: 81 TABLET, FILM COATED ORAL at 00:05

## 2021-04-30 RX ADMIN — MORPHINE SULFATE 2 MG: 2 INJECTION, SOLUTION INTRAMUSCULAR; INTRAVENOUS at 15:13

## 2021-04-30 RX ADMIN — SODIUM CHLORIDE, PRESERVATIVE FREE 10 ML: 5 INJECTION INTRAVENOUS at 00:03

## 2021-04-30 RX ADMIN — LIDOCAINE HYDROCHLORIDE,EPINEPHRINE BITARTRATE 5 ML: 20; .01 INJECTION, SOLUTION INFILTRATION; PERINEURAL at 15:19

## 2021-04-30 RX ADMIN — MORPHINE SULFATE 2 MG: 2 INJECTION, SOLUTION INTRAMUSCULAR; INTRAVENOUS at 07:35

## 2021-04-30 RX ADMIN — SODIUM CHLORIDE, PRESERVATIVE FREE 10 ML: 5 INJECTION INTRAVENOUS at 23:31

## 2021-05-01 ENCOUNTER — APPOINTMENT (OUTPATIENT)
Dept: GENERAL RADIOLOGY | Facility: HOSPITAL | Age: 56
End: 2021-05-01

## 2021-05-01 PROCEDURE — 71045 X-RAY EXAM CHEST 1 VIEW: CPT

## 2021-05-01 PROCEDURE — 25010000002 MORPHINE SULFATE (PF) 2 MG/ML SOLUTION: Performed by: THORACIC SURGERY (CARDIOTHORACIC VASCULAR SURGERY)

## 2021-05-01 PROCEDURE — 25010000002 ENOXAPARIN PER 10 MG: Performed by: THORACIC SURGERY (CARDIOTHORACIC VASCULAR SURGERY)

## 2021-05-01 PROCEDURE — 99231 SBSQ HOSP IP/OBS SF/LOW 25: CPT | Performed by: THORACIC SURGERY (CARDIOTHORACIC VASCULAR SURGERY)

## 2021-05-01 RX ORDER — METOPROLOL TARTRATE 50 MG/1
50 TABLET, FILM COATED ORAL 2 TIMES DAILY
Status: DISCONTINUED | OUTPATIENT
Start: 2021-05-01 | End: 2021-05-03

## 2021-05-01 RX ORDER — AMLODIPINE BESYLATE 5 MG/1
5 TABLET ORAL
Status: DISCONTINUED | OUTPATIENT
Start: 2021-05-01 | End: 2021-05-03

## 2021-05-01 RX ADMIN — MORPHINE SULFATE 2 MG: 2 INJECTION, SOLUTION INTRAMUSCULAR; INTRAVENOUS at 22:53

## 2021-05-01 RX ADMIN — ENOXAPARIN SODIUM 30 MG: 30 INJECTION SUBCUTANEOUS at 21:03

## 2021-05-01 RX ADMIN — SODIUM CHLORIDE, PRESERVATIVE FREE 10 ML: 5 INJECTION INTRAVENOUS at 08:24

## 2021-05-01 RX ADMIN — LIDOCAINE 1 PATCH: 50 PATCH CUTANEOUS at 08:27

## 2021-05-01 RX ADMIN — MORPHINE SULFATE 2 MG: 2 INJECTION, SOLUTION INTRAMUSCULAR; INTRAVENOUS at 14:22

## 2021-05-01 RX ADMIN — ISOSORBIDE MONONITRATE 30 MG: 30 TABLET, EXTENDED RELEASE ORAL at 20:53

## 2021-05-01 RX ADMIN — MORPHINE SULFATE 2 MG: 2 INJECTION, SOLUTION INTRAMUSCULAR; INTRAVENOUS at 09:58

## 2021-05-01 RX ADMIN — HYDROCODONE BITARTRATE AND ACETAMINOPHEN 1 TABLET: 7.5; 325 TABLET ORAL at 22:34

## 2021-05-01 RX ADMIN — METOPROLOL TARTRATE 50 MG: 50 TABLET, FILM COATED ORAL at 09:58

## 2021-05-01 RX ADMIN — MORPHINE SULFATE 2 MG: 2 INJECTION, SOLUTION INTRAMUSCULAR; INTRAVENOUS at 12:07

## 2021-05-01 RX ADMIN — MORPHINE SULFATE 2 MG: 2 INJECTION, SOLUTION INTRAMUSCULAR; INTRAVENOUS at 02:56

## 2021-05-01 RX ADMIN — ASPIRIN 81 MG: 81 TABLET, FILM COATED ORAL at 08:24

## 2021-05-01 RX ADMIN — MORPHINE SULFATE 2 MG: 2 INJECTION, SOLUTION INTRAMUSCULAR; INTRAVENOUS at 16:20

## 2021-05-01 RX ADMIN — METOPROLOL TARTRATE 50 MG: 50 TABLET, FILM COATED ORAL at 20:54

## 2021-05-01 RX ADMIN — DIAZEPAM 5 MG: 5 TABLET ORAL at 22:53

## 2021-05-01 RX ADMIN — MORPHINE SULFATE 2 MG: 2 INJECTION, SOLUTION INTRAMUSCULAR; INTRAVENOUS at 18:41

## 2021-05-01 RX ADMIN — ENOXAPARIN SODIUM 30 MG: 30 INJECTION SUBCUTANEOUS at 08:24

## 2021-05-01 RX ADMIN — MORPHINE SULFATE 2 MG: 2 INJECTION, SOLUTION INTRAMUSCULAR; INTRAVENOUS at 20:58

## 2021-05-01 RX ADMIN — MORPHINE SULFATE 2 MG: 2 INJECTION, SOLUTION INTRAMUSCULAR; INTRAVENOUS at 05:24

## 2021-05-01 RX ADMIN — MORPHINE SULFATE 2 MG: 2 INJECTION, SOLUTION INTRAMUSCULAR; INTRAVENOUS at 07:51

## 2021-05-01 RX ADMIN — SODIUM CHLORIDE, PRESERVATIVE FREE 10 ML: 5 INJECTION INTRAVENOUS at 20:54

## 2021-05-01 RX ADMIN — ATORVASTATIN CALCIUM 20 MG: 10 TABLET, FILM COATED ORAL at 20:53

## 2021-05-01 RX ADMIN — PANTOPRAZOLE SODIUM 40 MG: 40 TABLET, DELAYED RELEASE ORAL at 20:57

## 2021-05-01 RX ADMIN — PANTOPRAZOLE SODIUM 40 MG: 40 TABLET, DELAYED RELEASE ORAL at 08:24

## 2021-05-01 RX ADMIN — AMLODIPINE BESYLATE 5 MG: 5 TABLET ORAL at 09:58

## 2021-05-02 ENCOUNTER — APPOINTMENT (OUTPATIENT)
Dept: GENERAL RADIOLOGY | Facility: HOSPITAL | Age: 56
End: 2021-05-02

## 2021-05-02 DIAGNOSIS — C34.90 ADENOCARCINOMA OF LUNG, UNSPECIFIED LATERALITY (HCC): Primary | ICD-10-CM

## 2021-05-02 PROCEDURE — 25010000002 MORPHINE PER 10 MG: Performed by: NURSE PRACTITIONER

## 2021-05-02 PROCEDURE — 99231 SBSQ HOSP IP/OBS SF/LOW 25: CPT | Performed by: THORACIC SURGERY (CARDIOTHORACIC VASCULAR SURGERY)

## 2021-05-02 PROCEDURE — 25010000002 MORPHINE SULFATE (PF) 2 MG/ML SOLUTION: Performed by: THORACIC SURGERY (CARDIOTHORACIC VASCULAR SURGERY)

## 2021-05-02 PROCEDURE — 25010000002 ENOXAPARIN PER 10 MG: Performed by: THORACIC SURGERY (CARDIOTHORACIC VASCULAR SURGERY)

## 2021-05-02 PROCEDURE — 71045 X-RAY EXAM CHEST 1 VIEW: CPT

## 2021-05-02 RX ORDER — MORPHINE SULFATE 15 MG/1
15 TABLET, FILM COATED, EXTENDED RELEASE ORAL EVERY 12 HOURS SCHEDULED
Status: DISCONTINUED | OUTPATIENT
Start: 2021-05-02 | End: 2021-05-03

## 2021-05-02 RX ADMIN — MORPHINE SULFATE 2 MG: 2 INJECTION, SOLUTION INTRAMUSCULAR; INTRAVENOUS at 05:28

## 2021-05-02 RX ADMIN — MORPHINE SULFATE 4 MG: 4 INJECTION, SOLUTION INTRAMUSCULAR; INTRAVENOUS at 22:32

## 2021-05-02 RX ADMIN — ASPIRIN 81 MG: 81 TABLET, FILM COATED ORAL at 08:37

## 2021-05-02 RX ADMIN — PANTOPRAZOLE SODIUM 40 MG: 40 TABLET, DELAYED RELEASE ORAL at 21:02

## 2021-05-02 RX ADMIN — MORPHINE SULFATE 2 MG: 2 INJECTION, SOLUTION INTRAMUSCULAR; INTRAVENOUS at 03:27

## 2021-05-02 RX ADMIN — AMLODIPINE BESYLATE 5 MG: 5 TABLET ORAL at 08:37

## 2021-05-02 RX ADMIN — MORPHINE SULFATE 4 MG: 4 INJECTION, SOLUTION INTRAMUSCULAR; INTRAVENOUS at 18:28

## 2021-05-02 RX ADMIN — PANTOPRAZOLE SODIUM 40 MG: 40 TABLET, DELAYED RELEASE ORAL at 08:37

## 2021-05-02 RX ADMIN — ENOXAPARIN SODIUM 30 MG: 30 INJECTION SUBCUTANEOUS at 08:36

## 2021-05-02 RX ADMIN — MORPHINE SULFATE 4 MG: 4 INJECTION, SOLUTION INTRAMUSCULAR; INTRAVENOUS at 11:08

## 2021-05-02 RX ADMIN — ATORVASTATIN CALCIUM 20 MG: 10 TABLET, FILM COATED ORAL at 21:02

## 2021-05-02 RX ADMIN — LIDOCAINE 1 PATCH: 50 PATCH CUTANEOUS at 08:40

## 2021-05-02 RX ADMIN — MORPHINE SULFATE 2 MG: 2 INJECTION, SOLUTION INTRAMUSCULAR; INTRAVENOUS at 01:27

## 2021-05-02 RX ADMIN — HYDROCODONE BITARTRATE AND ACETAMINOPHEN 1 TABLET: 7.5; 325 TABLET ORAL at 23:33

## 2021-05-02 RX ADMIN — SODIUM CHLORIDE, PRESERVATIVE FREE 10 ML: 5 INJECTION INTRAVENOUS at 21:03

## 2021-05-02 RX ADMIN — METOPROLOL TARTRATE 50 MG: 50 TABLET, FILM COATED ORAL at 21:02

## 2021-05-02 RX ADMIN — ISOSORBIDE MONONITRATE 30 MG: 30 TABLET, EXTENDED RELEASE ORAL at 21:02

## 2021-05-02 RX ADMIN — ENOXAPARIN SODIUM 30 MG: 30 INJECTION SUBCUTANEOUS at 21:03

## 2021-05-02 RX ADMIN — MORPHINE SULFATE 4 MG: 4 INJECTION, SOLUTION INTRAMUSCULAR; INTRAVENOUS at 13:44

## 2021-05-02 RX ADMIN — MORPHINE SULFATE 4 MG: 4 INJECTION, SOLUTION INTRAMUSCULAR; INTRAVENOUS at 16:29

## 2021-05-02 RX ADMIN — METOPROLOL TARTRATE 50 MG: 50 TABLET, FILM COATED ORAL at 08:37

## 2021-05-02 RX ADMIN — SODIUM CHLORIDE, PRESERVATIVE FREE 10 ML: 5 INJECTION INTRAVENOUS at 08:36

## 2021-05-02 RX ADMIN — MORPHINE SULFATE 15 MG: 15 TABLET, FILM COATED, EXTENDED RELEASE ORAL at 21:02

## 2021-05-02 RX ADMIN — MORPHINE SULFATE 2 MG: 2 INJECTION, SOLUTION INTRAMUSCULAR; INTRAVENOUS at 07:34

## 2021-05-02 RX ADMIN — MORPHINE SULFATE 15 MG: 15 TABLET, FILM COATED, EXTENDED RELEASE ORAL at 08:37

## 2021-05-03 ENCOUNTER — APPOINTMENT (OUTPATIENT)
Dept: GENERAL RADIOLOGY | Facility: HOSPITAL | Age: 56
End: 2021-05-03

## 2021-05-03 ENCOUNTER — TRANSCRIBE ORDERS (OUTPATIENT)
Dept: ADMINISTRATIVE | Facility: HOSPITAL | Age: 56
End: 2021-05-03

## 2021-05-03 DIAGNOSIS — C34.90 ADENOSQUAMOUS CARCINOMA OF LUNG, UNSPECIFIED LATERALITY (HCC): Primary | ICD-10-CM

## 2021-05-03 PROBLEM — G89.29 CHRONIC BACK PAIN: Status: ACTIVE | Noted: 2021-05-03

## 2021-05-03 PROBLEM — S27.0XXA PNEUMOTHORAX, TRAUMATIC: Status: RESOLVED | Noted: 2021-04-29 | Resolved: 2021-05-03

## 2021-05-03 PROBLEM — J95.811 POSTPROCEDURAL PNEUMOTHORAX: Status: ACTIVE | Noted: 2021-05-03

## 2021-05-03 PROBLEM — G89.4 CHRONIC PAIN SYNDROME: Status: ACTIVE | Noted: 2021-05-03

## 2021-05-03 PROBLEM — M54.9 CHRONIC BACK PAIN: Status: ACTIVE | Noted: 2021-05-03

## 2021-05-03 LAB
ALBUMIN SERPL-MCNC: 3.2 G/DL (ref 3.5–5.2)
ALBUMIN/GLOB SERPL: 1.1 G/DL
ALP SERPL-CCNC: 138 U/L (ref 39–117)
ALT SERPL W P-5'-P-CCNC: 11 U/L (ref 1–33)
ANION GAP SERPL CALCULATED.3IONS-SCNC: 8 MMOL/L (ref 5–15)
ANISOCYTOSIS BLD QL: ABNORMAL
AST SERPL-CCNC: 17 U/L (ref 1–32)
BASOPHILS # BLD MANUAL: 0.06 10*3/MM3 (ref 0–0.2)
BASOPHILS NFR BLD AUTO: 1 % (ref 0–1.5)
BILIRUB SERPL-MCNC: 0.4 MG/DL (ref 0–1.2)
BUN SERPL-MCNC: 8 MG/DL (ref 6–20)
BUN/CREAT SERPL: 14.3 (ref 7–25)
CALCIUM SPEC-SCNC: 8.5 MG/DL (ref 8.6–10.5)
CHLORIDE SERPL-SCNC: 97 MMOL/L (ref 98–107)
CO2 SERPL-SCNC: 33 MMOL/L (ref 22–29)
CREAT SERPL-MCNC: 0.56 MG/DL (ref 0.57–1)
DEPRECATED RDW RBC AUTO: 61.4 FL (ref 37–54)
ERYTHROCYTE [DISTWIDTH] IN BLOOD BY AUTOMATED COUNT: 14.8 % (ref 12.3–15.4)
GFR SERPL CREATININE-BSD FRML MDRD: 112 ML/MIN/1.73
GLOBULIN UR ELPH-MCNC: 2.9 GM/DL
GLUCOSE SERPL-MCNC: 95 MG/DL (ref 65–99)
HCT VFR BLD AUTO: 36.8 % (ref 34–46.6)
HGB BLD-MCNC: 12.3 G/DL (ref 12–15.9)
LYMPHOCYTES # BLD MANUAL: 0.64 10*3/MM3 (ref 0.7–3.1)
LYMPHOCYTES NFR BLD MANUAL: 11.1 % (ref 19.6–45.3)
LYMPHOCYTES NFR BLD MANUAL: 5.1 % (ref 5–12)
MACROCYTES BLD QL SMEAR: ABNORMAL
MCH RBC QN AUTO: 37.2 PG (ref 26.6–33)
MCHC RBC AUTO-ENTMCNC: 33.4 G/DL (ref 31.5–35.7)
MCV RBC AUTO: 111.2 FL (ref 79–97)
MONOCYTES # BLD AUTO: 0.29 10*3/MM3 (ref 0.1–0.9)
NEUTROPHILS # BLD AUTO: 4.79 10*3/MM3 (ref 1.7–7)
NEUTROPHILS NFR BLD MANUAL: 82.8 % (ref 42.7–76)
PLAT MORPH BLD: NORMAL
PLATELET # BLD AUTO: 222 10*3/MM3 (ref 140–450)
PMV BLD AUTO: 10.4 FL (ref 6–12)
POTASSIUM SERPL-SCNC: 3.7 MMOL/L (ref 3.5–5.2)
PROT SERPL-MCNC: 6.1 G/DL (ref 6–8.5)
RBC # BLD AUTO: 3.31 10*6/MM3 (ref 3.77–5.28)
SODIUM SERPL-SCNC: 138 MMOL/L (ref 136–145)
WBC # BLD AUTO: 5.78 10*3/MM3 (ref 3.4–10.8)
WBC MORPH BLD: NORMAL

## 2021-05-03 PROCEDURE — 80053 COMPREHEN METABOLIC PANEL: CPT | Performed by: NURSE PRACTITIONER

## 2021-05-03 PROCEDURE — 71045 X-RAY EXAM CHEST 1 VIEW: CPT

## 2021-05-03 PROCEDURE — 25010000002 MORPHINE PER 10 MG: Performed by: NURSE PRACTITIONER

## 2021-05-03 PROCEDURE — 99231 SBSQ HOSP IP/OBS SF/LOW 25: CPT | Performed by: NURSE PRACTITIONER

## 2021-05-03 PROCEDURE — 85025 COMPLETE CBC W/AUTO DIFF WBC: CPT | Performed by: NURSE PRACTITIONER

## 2021-05-03 PROCEDURE — 85007 BL SMEAR W/DIFF WBC COUNT: CPT | Performed by: NURSE PRACTITIONER

## 2021-05-03 PROCEDURE — 25010000002 ENOXAPARIN PER 10 MG: Performed by: THORACIC SURGERY (CARDIOTHORACIC VASCULAR SURGERY)

## 2021-05-03 RX ORDER — AMLODIPINE BESYLATE 5 MG/1
2.5 TABLET ORAL
Status: DISCONTINUED | OUTPATIENT
Start: 2021-05-04 | End: 2021-05-03

## 2021-05-03 RX ORDER — FLUCONAZOLE 200 MG/1
100 TABLET ORAL EVERY 24 HOURS
Status: DISCONTINUED | OUTPATIENT
Start: 2021-05-03 | End: 2021-05-07 | Stop reason: HOSPADM

## 2021-05-03 RX ORDER — HYDROCODONE BITARTRATE AND ACETAMINOPHEN 10; 325 MG/1; MG/1
1 TABLET ORAL EVERY 6 HOURS PRN
Status: DISCONTINUED | OUTPATIENT
Start: 2021-05-03 | End: 2021-05-04

## 2021-05-03 RX ADMIN — MORPHINE SULFATE 4 MG: 4 INJECTION, SOLUTION INTRAMUSCULAR; INTRAVENOUS at 04:25

## 2021-05-03 RX ADMIN — METOPROLOL TARTRATE 25 MG: 50 TABLET, FILM COATED ORAL at 11:20

## 2021-05-03 RX ADMIN — ENOXAPARIN SODIUM 30 MG: 30 INJECTION SUBCUTANEOUS at 20:06

## 2021-05-03 RX ADMIN — HYDROCODONE BITARTRATE AND ACETAMINOPHEN 1 TABLET: 10; 325 TABLET ORAL at 17:50

## 2021-05-03 RX ADMIN — MORPHINE SULFATE 4 MG: 4 INJECTION, SOLUTION INTRAMUSCULAR; INTRAVENOUS at 06:25

## 2021-05-03 RX ADMIN — MORPHINE SULFATE 4 MG: 4 INJECTION, SOLUTION INTRAMUSCULAR; INTRAVENOUS at 02:30

## 2021-05-03 RX ADMIN — ATORVASTATIN CALCIUM 20 MG: 10 TABLET, FILM COATED ORAL at 20:06

## 2021-05-03 RX ADMIN — SODIUM CHLORIDE, PRESERVATIVE FREE 10 ML: 5 INJECTION INTRAVENOUS at 20:07

## 2021-05-03 RX ADMIN — SODIUM CHLORIDE, PRESERVATIVE FREE 10 ML: 5 INJECTION INTRAVENOUS at 09:04

## 2021-05-03 RX ADMIN — ISOSORBIDE MONONITRATE 30 MG: 30 TABLET, EXTENDED RELEASE ORAL at 20:06

## 2021-05-03 RX ADMIN — NYSTATIN 500000 UNITS: 100000 SUSPENSION ORAL at 20:06

## 2021-05-03 RX ADMIN — MORPHINE SULFATE 15 MG: 15 TABLET, FILM COATED, EXTENDED RELEASE ORAL at 09:05

## 2021-05-03 RX ADMIN — HYDROCODONE BITARTRATE AND ACETAMINOPHEN 1 TABLET: 7.5; 325 TABLET ORAL at 07:49

## 2021-05-03 RX ADMIN — ASPIRIN 81 MG: 81 TABLET, FILM COATED ORAL at 09:03

## 2021-05-03 RX ADMIN — HYDROCODONE BITARTRATE AND ACETAMINOPHEN 1 TABLET: 10; 325 TABLET ORAL at 23:50

## 2021-05-03 RX ADMIN — AMLODIPINE BESYLATE 2.5 MG: 5 TABLET ORAL at 11:21

## 2021-05-03 RX ADMIN — NYSTATIN 500000 UNITS: 100000 SUSPENSION ORAL at 09:03

## 2021-05-03 RX ADMIN — NYSTATIN 500000 UNITS: 100000 SUSPENSION ORAL at 17:50

## 2021-05-03 RX ADMIN — PANTOPRAZOLE SODIUM 40 MG: 40 TABLET, DELAYED RELEASE ORAL at 20:06

## 2021-05-03 RX ADMIN — PANTOPRAZOLE SODIUM 40 MG: 40 TABLET, DELAYED RELEASE ORAL at 07:50

## 2021-05-03 RX ADMIN — ENOXAPARIN SODIUM 30 MG: 30 INJECTION SUBCUTANEOUS at 07:52

## 2021-05-03 RX ADMIN — METOPROLOL TARTRATE 25 MG: 50 TABLET, FILM COATED ORAL at 20:09

## 2021-05-03 RX ADMIN — FLUCONAZOLE 100 MG: 200 TABLET ORAL at 09:02

## 2021-05-03 RX ADMIN — HYDROCODONE BITARTRATE AND ACETAMINOPHEN 1 TABLET: 10; 325 TABLET ORAL at 12:05

## 2021-05-03 RX ADMIN — LIDOCAINE 1 PATCH: 50 PATCH CUTANEOUS at 09:04

## 2021-05-03 RX ADMIN — MORPHINE SULFATE 4 MG: 4 INJECTION, SOLUTION INTRAMUSCULAR; INTRAVENOUS at 00:31

## 2021-05-04 ENCOUNTER — APPOINTMENT (OUTPATIENT)
Dept: GENERAL RADIOLOGY | Facility: HOSPITAL | Age: 56
End: 2021-05-04

## 2021-05-04 PROCEDURE — 25010000002 ENOXAPARIN PER 10 MG: Performed by: THORACIC SURGERY (CARDIOTHORACIC VASCULAR SURGERY)

## 2021-05-04 PROCEDURE — 99231 SBSQ HOSP IP/OBS SF/LOW 25: CPT | Performed by: NURSE PRACTITIONER

## 2021-05-04 PROCEDURE — 99222 1ST HOSP IP/OBS MODERATE 55: CPT | Performed by: CLINICAL NURSE SPECIALIST

## 2021-05-04 PROCEDURE — 71045 X-RAY EXAM CHEST 1 VIEW: CPT

## 2021-05-04 RX ORDER — HYDROCODONE BITARTRATE AND ACETAMINOPHEN 10; 325 MG/1; MG/1
1 TABLET ORAL EVERY 4 HOURS PRN
Status: DISCONTINUED | OUTPATIENT
Start: 2021-05-04 | End: 2021-05-07 | Stop reason: HOSPADM

## 2021-05-04 RX ORDER — DULOXETIN HYDROCHLORIDE 30 MG/1
30 CAPSULE, DELAYED RELEASE ORAL DAILY
Status: DISCONTINUED | OUTPATIENT
Start: 2021-05-04 | End: 2021-05-07 | Stop reason: HOSPADM

## 2021-05-04 RX ADMIN — NYSTATIN 500000 UNITS: 100000 SUSPENSION ORAL at 11:15

## 2021-05-04 RX ADMIN — PANTOPRAZOLE SODIUM 40 MG: 40 TABLET, DELAYED RELEASE ORAL at 09:19

## 2021-05-04 RX ADMIN — HYDROCODONE BITARTRATE AND ACETAMINOPHEN 1 TABLET: 10; 325 TABLET ORAL at 18:19

## 2021-05-04 RX ADMIN — METOPROLOL TARTRATE 25 MG: 50 TABLET, FILM COATED ORAL at 21:43

## 2021-05-04 RX ADMIN — NYSTATIN 500000 UNITS: 100000 SUSPENSION ORAL at 09:18

## 2021-05-04 RX ADMIN — HYDROCODONE BITARTRATE AND ACETAMINOPHEN 1 TABLET: 10; 325 TABLET ORAL at 05:54

## 2021-05-04 RX ADMIN — SODIUM CHLORIDE, PRESERVATIVE FREE 10 ML: 5 INJECTION INTRAVENOUS at 21:47

## 2021-05-04 RX ADMIN — SODIUM CHLORIDE, PRESERVATIVE FREE 10 ML: 5 INJECTION INTRAVENOUS at 09:20

## 2021-05-04 RX ADMIN — FLUCONAZOLE 100 MG: 200 TABLET ORAL at 09:19

## 2021-05-04 RX ADMIN — ENOXAPARIN SODIUM 30 MG: 30 INJECTION SUBCUTANEOUS at 21:42

## 2021-05-04 RX ADMIN — ATORVASTATIN CALCIUM 20 MG: 10 TABLET, FILM COATED ORAL at 21:43

## 2021-05-04 RX ADMIN — PANTOPRAZOLE SODIUM 40 MG: 40 TABLET, DELAYED RELEASE ORAL at 21:43

## 2021-05-04 RX ADMIN — NYSTATIN 500000 UNITS: 100000 SUSPENSION ORAL at 17:41

## 2021-05-04 RX ADMIN — ENOXAPARIN SODIUM 30 MG: 30 INJECTION SUBCUTANEOUS at 09:20

## 2021-05-04 RX ADMIN — HYDROCODONE BITARTRATE AND ACETAMINOPHEN 1 TABLET: 10; 325 TABLET ORAL at 22:28

## 2021-05-04 RX ADMIN — HYDROCODONE BITARTRATE AND ACETAMINOPHEN 1 TABLET: 10; 325 TABLET ORAL at 12:10

## 2021-05-04 RX ADMIN — LIDOCAINE 1 PATCH: 50 PATCH CUTANEOUS at 09:20

## 2021-05-04 RX ADMIN — METOPROLOL TARTRATE 25 MG: 50 TABLET, FILM COATED ORAL at 09:18

## 2021-05-04 RX ADMIN — ISOSORBIDE MONONITRATE 30 MG: 30 TABLET, EXTENDED RELEASE ORAL at 21:43

## 2021-05-04 RX ADMIN — NYSTATIN 500000 UNITS: 100000 SUSPENSION ORAL at 21:47

## 2021-05-04 RX ADMIN — ASPIRIN 81 MG: 81 TABLET, FILM COATED ORAL at 09:19

## 2021-05-05 ENCOUNTER — APPOINTMENT (OUTPATIENT)
Dept: GENERAL RADIOLOGY | Facility: HOSPITAL | Age: 56
End: 2021-05-05

## 2021-05-05 PROCEDURE — 99231 SBSQ HOSP IP/OBS SF/LOW 25: CPT | Performed by: NURSE PRACTITIONER

## 2021-05-05 PROCEDURE — 25010000002 ENOXAPARIN PER 10 MG: Performed by: THORACIC SURGERY (CARDIOTHORACIC VASCULAR SURGERY)

## 2021-05-05 PROCEDURE — 99233 SBSQ HOSP IP/OBS HIGH 50: CPT | Performed by: CLINICAL NURSE SPECIALIST

## 2021-05-05 PROCEDURE — 71045 X-RAY EXAM CHEST 1 VIEW: CPT

## 2021-05-05 RX ADMIN — ASPIRIN 81 MG: 81 TABLET, FILM COATED ORAL at 09:16

## 2021-05-05 RX ADMIN — HYDROCODONE BITARTRATE AND ACETAMINOPHEN 1 TABLET: 10; 325 TABLET ORAL at 06:30

## 2021-05-05 RX ADMIN — HYDROCODONE BITARTRATE AND ACETAMINOPHEN 1 TABLET: 10; 325 TABLET ORAL at 10:35

## 2021-05-05 RX ADMIN — HYDROCODONE BITARTRATE AND ACETAMINOPHEN 1 TABLET: 10; 325 TABLET ORAL at 18:38

## 2021-05-05 RX ADMIN — HYDROCODONE BITARTRATE AND ACETAMINOPHEN 1 TABLET: 10; 325 TABLET ORAL at 22:45

## 2021-05-05 RX ADMIN — SODIUM CHLORIDE, PRESERVATIVE FREE 10 ML: 5 INJECTION INTRAVENOUS at 20:38

## 2021-05-05 RX ADMIN — METOPROLOL TARTRATE 25 MG: 50 TABLET, FILM COATED ORAL at 20:37

## 2021-05-05 RX ADMIN — PANTOPRAZOLE SODIUM 40 MG: 40 TABLET, DELAYED RELEASE ORAL at 09:17

## 2021-05-05 RX ADMIN — HYDROCODONE BITARTRATE AND ACETAMINOPHEN 1 TABLET: 10; 325 TABLET ORAL at 14:50

## 2021-05-05 RX ADMIN — LIDOCAINE 1 PATCH: 50 PATCH CUTANEOUS at 10:44

## 2021-05-05 RX ADMIN — SODIUM CHLORIDE, PRESERVATIVE FREE 10 ML: 5 INJECTION INTRAVENOUS at 10:43

## 2021-05-05 RX ADMIN — NYSTATIN 500000 UNITS: 100000 SUSPENSION ORAL at 09:16

## 2021-05-05 RX ADMIN — ATORVASTATIN CALCIUM 20 MG: 10 TABLET, FILM COATED ORAL at 20:37

## 2021-05-05 RX ADMIN — HYDROCODONE BITARTRATE AND ACETAMINOPHEN 1 TABLET: 10; 325 TABLET ORAL at 02:30

## 2021-05-05 RX ADMIN — ENOXAPARIN SODIUM 30 MG: 30 INJECTION SUBCUTANEOUS at 20:37

## 2021-05-05 RX ADMIN — NYSTATIN 500000 UNITS: 100000 SUSPENSION ORAL at 20:37

## 2021-05-05 RX ADMIN — ISOSORBIDE MONONITRATE 30 MG: 30 TABLET, EXTENDED RELEASE ORAL at 20:37

## 2021-05-05 RX ADMIN — PANTOPRAZOLE SODIUM 40 MG: 40 TABLET, DELAYED RELEASE ORAL at 20:37

## 2021-05-05 RX ADMIN — METOPROLOL TARTRATE 25 MG: 50 TABLET, FILM COATED ORAL at 09:16

## 2021-05-05 RX ADMIN — DIAZEPAM 5 MG: 5 TABLET ORAL at 18:42

## 2021-05-05 RX ADMIN — FLUCONAZOLE 100 MG: 200 TABLET ORAL at 09:16

## 2021-05-05 RX ADMIN — ENOXAPARIN SODIUM 30 MG: 30 INJECTION SUBCUTANEOUS at 09:18

## 2021-05-06 ENCOUNTER — APPOINTMENT (OUTPATIENT)
Dept: GENERAL RADIOLOGY | Facility: HOSPITAL | Age: 56
End: 2021-05-06

## 2021-05-06 PROCEDURE — 71045 X-RAY EXAM CHEST 1 VIEW: CPT

## 2021-05-06 PROCEDURE — 25010000002 ENOXAPARIN PER 10 MG: Performed by: THORACIC SURGERY (CARDIOTHORACIC VASCULAR SURGERY)

## 2021-05-06 PROCEDURE — 97162 PT EVAL MOD COMPLEX 30 MIN: CPT

## 2021-05-06 PROCEDURE — 99232 SBSQ HOSP IP/OBS MODERATE 35: CPT | Performed by: CLINICAL NURSE SPECIALIST

## 2021-05-06 PROCEDURE — 99231 SBSQ HOSP IP/OBS SF/LOW 25: CPT | Performed by: NURSE PRACTITIONER

## 2021-05-06 PROCEDURE — 99221 1ST HOSP IP/OBS SF/LOW 40: CPT | Performed by: NURSE PRACTITIONER

## 2021-05-06 RX ADMIN — HYDROCODONE BITARTRATE AND ACETAMINOPHEN 1 TABLET: 10; 325 TABLET ORAL at 14:54

## 2021-05-06 RX ADMIN — PANTOPRAZOLE SODIUM 40 MG: 40 TABLET, DELAYED RELEASE ORAL at 08:46

## 2021-05-06 RX ADMIN — HYDROCODONE BITARTRATE AND ACETAMINOPHEN 1 TABLET: 10; 325 TABLET ORAL at 20:04

## 2021-05-06 RX ADMIN — ENOXAPARIN SODIUM 30 MG: 30 INJECTION SUBCUTANEOUS at 08:47

## 2021-05-06 RX ADMIN — ISOSORBIDE MONONITRATE 30 MG: 30 TABLET, EXTENDED RELEASE ORAL at 20:04

## 2021-05-06 RX ADMIN — METOPROLOL TARTRATE 25 MG: 50 TABLET, FILM COATED ORAL at 08:47

## 2021-05-06 RX ADMIN — SODIUM CHLORIDE, PRESERVATIVE FREE 10 ML: 5 INJECTION INTRAVENOUS at 08:47

## 2021-05-06 RX ADMIN — HYDROCODONE BITARTRATE AND ACETAMINOPHEN 1 TABLET: 10; 325 TABLET ORAL at 02:47

## 2021-05-06 RX ADMIN — ENOXAPARIN SODIUM 30 MG: 30 INJECTION SUBCUTANEOUS at 20:04

## 2021-05-06 RX ADMIN — PANTOPRAZOLE SODIUM 40 MG: 40 TABLET, DELAYED RELEASE ORAL at 20:04

## 2021-05-06 RX ADMIN — HYDROCODONE BITARTRATE AND ACETAMINOPHEN 1 TABLET: 10; 325 TABLET ORAL at 06:49

## 2021-05-06 RX ADMIN — HYDROCODONE BITARTRATE AND ACETAMINOPHEN 1 TABLET: 10; 325 TABLET ORAL at 10:51

## 2021-05-06 RX ADMIN — NYSTATIN 500000 UNITS: 100000 SUSPENSION ORAL at 08:46

## 2021-05-06 RX ADMIN — ASPIRIN 81 MG: 81 TABLET, FILM COATED ORAL at 08:46

## 2021-05-06 RX ADMIN — LIDOCAINE 1 PATCH: 50 PATCH CUTANEOUS at 08:49

## 2021-05-06 RX ADMIN — ATORVASTATIN CALCIUM 20 MG: 10 TABLET, FILM COATED ORAL at 20:04

## 2021-05-06 RX ADMIN — FLUCONAZOLE 100 MG: 200 TABLET ORAL at 08:46

## 2021-05-07 ENCOUNTER — APPOINTMENT (OUTPATIENT)
Dept: GENERAL RADIOLOGY | Facility: HOSPITAL | Age: 56
End: 2021-05-07

## 2021-05-07 VITALS
RESPIRATION RATE: 16 BRPM | HEIGHT: 61 IN | WEIGHT: 61.73 LBS | OXYGEN SATURATION: 98 % | HEART RATE: 67 BPM | TEMPERATURE: 98 F | SYSTOLIC BLOOD PRESSURE: 153 MMHG | DIASTOLIC BLOOD PRESSURE: 70 MMHG | BODY MASS INDEX: 11.65 KG/M2

## 2021-05-07 PROCEDURE — 99231 SBSQ HOSP IP/OBS SF/LOW 25: CPT | Performed by: NURSE PRACTITIONER

## 2021-05-07 PROCEDURE — 71045 X-RAY EXAM CHEST 1 VIEW: CPT

## 2021-05-07 PROCEDURE — 97116 GAIT TRAINING THERAPY: CPT | Performed by: PHYSICAL THERAPIST

## 2021-05-07 PROCEDURE — 25010000002 ENOXAPARIN PER 10 MG: Performed by: THORACIC SURGERY (CARDIOTHORACIC VASCULAR SURGERY)

## 2021-05-07 PROCEDURE — 97530 THERAPEUTIC ACTIVITIES: CPT | Performed by: PHYSICAL THERAPIST

## 2021-05-07 PROCEDURE — 71046 X-RAY EXAM CHEST 2 VIEWS: CPT

## 2021-05-07 RX ORDER — DULOXETIN HYDROCHLORIDE 30 MG/1
30 CAPSULE, DELAYED RELEASE ORAL DAILY
Qty: 30 CAPSULE | Refills: 0 | Status: SHIPPED | OUTPATIENT
Start: 2021-05-08 | End: 2021-06-10

## 2021-05-07 RX ADMIN — ENOXAPARIN SODIUM 30 MG: 30 INJECTION SUBCUTANEOUS at 09:26

## 2021-05-07 RX ADMIN — LIDOCAINE 1 PATCH: 50 PATCH CUTANEOUS at 09:26

## 2021-05-07 RX ADMIN — HYDROCODONE BITARTRATE AND ACETAMINOPHEN 1 TABLET: 10; 325 TABLET ORAL at 00:16

## 2021-05-07 RX ADMIN — FLUCONAZOLE 100 MG: 200 TABLET ORAL at 09:25

## 2021-05-07 RX ADMIN — HYDROCODONE BITARTRATE AND ACETAMINOPHEN 1 TABLET: 10; 325 TABLET ORAL at 13:38

## 2021-05-07 RX ADMIN — HYDROCODONE BITARTRATE AND ACETAMINOPHEN 1 TABLET: 10; 325 TABLET ORAL at 17:47

## 2021-05-07 RX ADMIN — ASPIRIN 81 MG: 81 TABLET, FILM COATED ORAL at 09:26

## 2021-05-07 RX ADMIN — HYDROCODONE BITARTRATE AND ACETAMINOPHEN 1 TABLET: 10; 325 TABLET ORAL at 04:22

## 2021-05-07 RX ADMIN — PANTOPRAZOLE SODIUM 40 MG: 40 TABLET, DELAYED RELEASE ORAL at 09:25

## 2021-05-07 RX ADMIN — HYDROCODONE BITARTRATE AND ACETAMINOPHEN 1 TABLET: 10; 325 TABLET ORAL at 09:25

## 2021-05-08 ENCOUNTER — READMISSION MANAGEMENT (OUTPATIENT)
Dept: CALL CENTER | Facility: HOSPITAL | Age: 56
End: 2021-05-08

## 2021-05-08 NOTE — THERAPY DISCHARGE NOTE
Acute Care - Physical Therapy Discharge Summary  [facility]       Patient Name: Rin Soler  : 1965  MRN: [id number]    Today's Date: 2021                 Admit Date: 2021      PT Recommendation and Plan    Visit Dx:    ICD-10-CM ICD-9-CM   1. Traumatic pneumothorax, initial encounter  S27.0XXA 860.0   2. Impaired mobility  Z74.09 799.89               PT Rehab Goals     Row Name 21 0708             Gait Training Goal 1 (PT)    Activity/Assistive Device (Gait Training Goal 1, PT)  gait (walking locomotion);decrease fall risk;improve balance and speed;increase endurance/gait distance  -AB      [place] Level (Gait Training Goal 1, PT)  independent  -AB      Distance (Gait Training Goal 1, PT)  250  -AB      Time Frame (Gait Training Goal 1, PT)  long term goal (LTG);10 days  -AB      Progress/Outcome (Gait Training Goal 1, PT)  goal not met  -AB         Stairs Goal 1 (PT)    Activity/Assistive Device (Stairs Goal 1, PT)  ascending stairs;descending stairs  -AB      [place] Level/Cues Needed (Stairs Goal 1, PT)  supervision required  -AB      Number of Stairs (Stairs Goal 1, PT)  2-3 steps  -AB      Time Frame (Stairs Goal 1, PT)  long term goal (LTG);10 days  -AB      Progress/Outcome (Stairs Goal 1, PT)  goal not met  -AB        User Key  (r) = Recorded By, (t) = Taken By, (c) = Cosigned By    Initials Name Provider Type Discipline    Lexii Wiggins PTA Physical Therapy Assistant PT              PT Discharge Summary  Anticipated Discharge Disposition (PT): home, home with assist  Reason for Discharge: Discharge from facility  Outcomes Achieved: Refer to plan of care for updates on goals achieved  Discharge Destination: Home with assist      Lexii Barrett PTA   2021

## 2021-05-08 NOTE — OUTREACH NOTE
Prep Survey      Responses   [facility] patient discharged from?  [facility]   Is LACE score < 7 ?  No   Emergency Room discharge w/ pulse ox?  No   Eligibility  Readm Mgmt   Discharge diagnosis   Pneumothorax, traumaticLung mass, concerning for malignancy    Does the patient have one of the following disease processes/diagnoses(primary or secondary)?  Other   Does the patient have Home health ordered?  No   Is there a DME ordered?  No   Prep survey completed?  Yes          Ivon Sadler RN

## 2021-05-10 PROBLEM — L89.152 PRESSURE INJURY OF COCCYGEAL REGION, STAGE 2 (HCC): Status: ACTIVE | Noted: 2021-05-10

## 2021-05-10 NOTE — PAYOR COMM NOTE
"5/10/21 [facility]  [phone]       D/C 5/7/21        Virginia Jimenez (56 y.o. Female)     Date of Birth Social Security Number Address Home Phone MRN    1965  [address]   [facility] [address] [phone] [id number]    Rastafarian Marital Status          [facility] Significant Other       Admission Date Admission Type Admitting Provider Attending Provider Department, Room/Bed    4/29/21 Emergency Steven Peguero MD  [facility] 4B, 442/1    Discharge Date Discharge Disposition Discharge Destination        5/7/2021 Home or Self Care              Attending Provider: (none)   Allergies: Plavix [Clopidogrel Bisulfate]    Isolation: None   Infection: None   Code Status: Prior    Ht: 154.9 cm (60.98\")   Wt: 28 kg (61 lb 11.7 oz)    Admission Cmt: None   Principal Problem: Postprocedural pneumothorax [J95.811]                 Active Insurance as of 4/29/2021     Primary Coverage     Payor Plan Insurance Group Employer/Plan Group    [employer] [place] [employer] [place] [id number]     Payor Plan Address Payor Plan Phone Number Payor Plan Fax Number Effective Dates    [address] [address] [phone]  12/7/2016 - None Entered    [facility] [id number]       Subscriber Name Subscriber Birth Date Member ID       VIRGINIA JIMENEZ [id number]                 Emergency Contacts      (Rel.) Home Phone Work Phone Mobile Phone    Malachi Salgado (Significant Other) [phone] -- --    Eryn Nelson (Sister) [phone] -- --                   "

## 2021-05-10 NOTE — DISCHARGE SUMMARY
[facility] Cardiothoracic Surgery  DISCHARGE SUMMARY        Date of Admission: 4/29/2021  Date of Discharge:  5/7/2021  Primary Care Physician: [facility] Mcfadden MD    Discharge Diagnoses:  Active Hospital Problems    Diagnosis    • **Postprocedural pneumothorax    • Pressure injury of coccygeal region, stage 2 (CMS/HCC)    • Chronic pain syndrome    • Chronic back pain    • Severe malnutrition (CMS/HCC)    • Lung mass    • COPD (chronic obstructive pulmonary disease) (CMS/HCC)    • Lung cancer (CMS/HCC)    • Essential hypertension    • Dyslipidemia    • Coronary artery disease involving native coronary artery of native heart with angina pectoris (CMS/HCC)    • Tobacco abuse    • GERD (gastroesophageal reflux disease)      Procedures Performed: Placement of pigtail right thoracostomy tube with local anesthetic by Dr. Bj Rapp performed on 4/30/2021.     HPI:  Ms. Rin Soler is a 56 y.o. female well known to the cardiothoracic surgery service after evaluation for possible resection of a right upper lobe lung nodule known since at least 2019, referred by Dr. Newberry from [place], [place]. She has a salient past medical history of coronary artery disease, history of previous stent, least a 65-pack-year history of smoking, known advanced COPD, chronic back pain, fibromyalgia, irritable bowel syndrome, and malnutrition. The lung nodule identified incidentally during work-up for chronic back pain.  Dr. Newberry did evaluate and with pulmonary function tests available at that time reported as severe obstructive lung disease with a DLCO of 20% she was referred to Dr. Jason Justice for surgical opinion. At that time, if was not felt she was a surgical candidate nor needle biopsy an option due to the location of the nodule. Earlier this year, she was referred to our office and seen by Dr. Peguero with decision-making made to proceed with needle biopsy. This was performed yesterday by  interventional radiology. Multiple core biopsy samples were obtained but this was complicated by a right pneumothorax to which a 6 [place] pigtail catheter was placed. Unfortunately, this pigtail catheter was dislodged and she was sent to the ER where Dr. Begum placed a 20 [place] right chest tube. She was admitted to the hospital. Chest x ray this morning shows a right chest tube in place, but with persistent right pneumothorax.    Hospital Course: A second right pigtail chest tube was placed due to persistent right pneumothorax. See separate note by Dr. Rapp detailing the procedure. Follow up chest x ray showed an improving right pneumothorax.  Chest tubes were kept to -20 cm H2O suction. Time was permitted to allow for right lung expansion and then air leak resolution. On 5/2/2021, the right lower chest tube was removed without remark. Final needle biopsy pathology identified a poorly differentiated adenocarcinoma. Oncology, palliative care, and wound care were consulted during the hospitalization. Options of definitive treatment with chemoradiation versus neoadjuvant approach were discussed but she remains undecided. The right pigtail chest tube was kept in place to suction and ultimately placed to water seal on 5/6/2021. On 5/7/2021, the right pigtail chest tube was removed without remark and follow up chest x ray demonstrated no pneumothorax. She met criteria for discharge home and is agreeable to discharge home with her significant other.     Condition on Discharge:  Neurologically intact. Chronic pain control is at baseline. She is eating well and has demonstrable good bowel function.  Right chest dressings are clean, dry, and intact. The heart is in normal sinus rhythm.         Discharge Disposition:  Home or Self Care [1]    Discharge Medications:     Discharge Medications      New Medications      Instructions Start Date   DULoxetine 30 MG capsule  Commonly known as: CYMBALTA   30 mg, Oral, Daily          Changes to Medications      Instructions Start Date   metoprolol tartrate 25 MG tablet  Commonly known as: LOPRESSOR  What changed: how much to take   12.5 mg, Oral, 2 Times Daily         Continue These Medications      Instructions Start Date   aspirin 81 MG EC tablet   81 mg, Oral, Daily      atorvastatin 20 MG tablet  Commonly known as: LIPITOR   20 mg, Oral, Daily      diazePAM 10 MG tablet  Commonly known as: VALIUM   10 mg, Oral, 3 Times Daily PRN      HYDROcodone-acetaminophen  MG per tablet  Commonly known as: NORCO   1 tablet, Oral, Every 4 Hours PRN, Every 4 to 6 hours prn      isosorbide mononitrate 30 MG 24 hr tablet  Commonly known as: IMDUR   30 mg, Oral, Daily      nitroglycerin 0.4 MG SL tablet  Commonly known as: Nitrostat   0.4 mg, Sublingual, Every 5 Minutes PRN, Take no more than 3 doses in 15 minutes.      pantoprazole 40 MG EC tablet  Commonly known as: PROTONIX   40 mg, Oral, Daily           Discharge Diet: Regular diet       Discharge Care Plan / Instructions:  Chest tube sites with dressings to keep on for 48 hours.  Ok to reapply dressing as needed after removal.      Activity at Discharge:  No heavy lifting greater than 5 pounds or a gallon of milk and refrain from driving until cleared.      Tobacco: Patient has been counseled as to smoking cessation. We discussed its benefits in regards to immediate recovery and the long-term benefits of avoidance of tobacco products. Informed of the smoking cessation program available here. We discussed the benefit of long-term health that tobacco cessation would convey.      BMI: Patient's Body mass index is 11.67 kg/m². indicating that she is underweight (BMI < 18.5). Recommendations include: referral to primary care and treating the underlying disease process.    Follow-up Appointments: Rin Soler  is requested to see [facility] Mcfadden MD within 1-2 weeks from time of discharge, to follow-up with Dr. Peguero in 4 weeks with CXR. Follow  up with oncology. Follow up with Dr. Newberry of the pulmonology service.     15 minutes for discharge planning

## 2021-05-12 ENCOUNTER — READMISSION MANAGEMENT (OUTPATIENT)
Dept: CALL CENTER | Facility: HOSPITAL | Age: 56
End: 2021-05-12

## 2021-05-12 ENCOUNTER — TELEPHONE (OUTPATIENT)
Dept: INFUSION THERAPY | Age: 56
End: 2021-05-12

## 2021-05-12 DIAGNOSIS — C34.90 ADENOCARCINOMA OF LUNG, UNSPECIFIED LATERALITY (HCC): Primary | ICD-10-CM

## 2021-05-12 NOTE — TELEPHONE ENCOUNTER
Enrrique Parekh with [facility] [employer] called and left message stating that the PT's PET Scan is still not approved, [facility] status is Hesitant/Pending at this time. Enrrique Parekh states she will contact the PT at this time.

## 2021-05-12 NOTE — OUTREACH NOTE
"Medical Week 1 Survey      Responses   [facility] patient discharged from?  [place]   Does the patient have one of the following disease processes/diagnoses(primary or secondary)?  Other   Week 1 attempt successful?  Yes   Call start time  1251   Call end time  1257   Discharge diagnosis   Pneumothorax, traumaticLung mass, concerning for malignancy    Meds reviewed with patient/caregiver?  Yes   Is the patient having any side effects they believe may be caused by any medication additions or changes?  No   Does the patient have all medications ordered at discharge?  Yes   Is the patient taking all medications as directed (includes completed medication regime)?  Yes   Does the patient have a primary care provider?   Yes   Does the patient have an appointment with their PCP within 7 days of discharge?  Yes   Has the patient kept scheduled appointments due by today?  N/A   Has home health visited the patient within 72 hours of discharge?  N/A   Has all DME been delivered?  No   Did the patient receive a copy of their discharge instructions?  Yes   Nursing interventions  Reviewed instructions with patient   What is the patient's perception of their health status since discharge?  Improving   Is the patient/caregiver able to teach back signs and symptoms related to disease process for when to call PCP?  Yes   Is the patient/caregiver able to teach back signs and symptoms related to disease process for when to call 911?  Yes   Is the patient/caregiver able to teach back the hierarchy of who to call/visit for symptoms/problems? PCP, Specialist, Home health nurse, Urgent Care, ED, 911  Yes   If the patient is a current smoker, are they able to teach back resources for cessation?  Not a smoker [Trying not to smoke.]   Additional teach back comments  She has complaints of \"nerve damage\". Encouraged moving PCP appt up to this week.   Week 1 call completed?  Yes   Wrap up additional comments  She is improving, but she has " worry about her health.          Theresa Trejo RN

## 2021-05-13 ENCOUNTER — APPOINTMENT (OUTPATIENT)
Dept: MRI IMAGING | Facility: HOSPITAL | Age: 56
End: 2021-05-13

## 2021-05-13 ENCOUNTER — APPOINTMENT (OUTPATIENT)
Dept: CT IMAGING | Facility: HOSPITAL | Age: 56
End: 2021-05-13

## 2021-05-17 ENCOUNTER — HOSPITAL ENCOUNTER (OUTPATIENT)
Dept: INFUSION THERAPY | Age: 56
End: 2021-05-17
Payer: MEDICAID

## 2021-05-17 DIAGNOSIS — C34.90 ADENOCARCINOMA OF LUNG, UNSPECIFIED LATERALITY (HCC): Primary | ICD-10-CM

## 2021-05-17 LAB
CYTO UR: NORMAL
LAB AP CASE REPORT: NORMAL
LAB AP CLINICAL INFORMATION: NORMAL
LAB AP DIAGNOSIS COMMENT: NORMAL
LAB AP INTRADEPARTMENTAL CONSULT: NORMAL
Lab: NORMAL
PATH REPORT.FINAL DX SPEC: NORMAL
PATH REPORT.GROSS SPEC: NORMAL

## 2021-05-20 ENCOUNTER — TELEPHONE (OUTPATIENT)
Dept: CARDIAC SURGERY | Facility: CLINIC | Age: 56
End: 2021-05-20

## 2021-05-20 DIAGNOSIS — C34.90 MALIGNANT NEOPLASM OF LUNG, UNSPECIFIED LATERALITY, UNSPECIFIED PART OF LUNG (HCC): Primary | ICD-10-CM

## 2021-05-21 ENCOUNTER — READMISSION MANAGEMENT (OUTPATIENT)
Dept: CALL CENTER | Facility: HOSPITAL | Age: 56
End: 2021-05-21

## 2021-05-21 NOTE — OUTREACH NOTE
Medical Week 2 Survey      Responses   [facility] patient discharged from?  [place]   Does the patient have one of the following disease processes/diagnoses(primary or secondary)?  Other   Week 2 attempt successful?  Yes   Call start time  1414   Call end time  1418   Is the patient taking all medications as directed (includes completed medication regime)?  Yes   Has the patient kept scheduled appointments due by today?  Yes   Comments  [facility] [place] [facility] [place], still having alot of  pain PCP manages pain control   What is the patient's perception of their health status since discharge?  Same   Week 2 Call Completed?  Yes   Wrap up additional comments  She is just worried, did not feel this bad before the hospital stay, Still has lots of pain.           Elmira Urias, RN

## 2021-05-25 ENCOUNTER — TELEPHONE (OUTPATIENT)
Dept: CARDIOLOGY | Facility: CLINIC | Age: 56
End: 2021-05-25

## 2021-05-25 NOTE — TELEPHONE ENCOUNTER
If her heart rate remains low then I would recommend holding her metoprolol altogether. She should continue to keep a blood pressure log and bring to her upcoming follow-up appointment.

## 2021-05-25 NOTE — TELEPHONE ENCOUNTER
Spoke with patient today and she stated she was admitted into [facility] after a lung bx.    She stated they changed her metoprolol dose to 1/2 tab twice a day.  She stated her HR was down around 39 but her BP has been high 157-174/ 100-105 range.    She would like to know what you advise for her to do.    She has an appt schedule 6-.    Please advise.    Thank you

## 2021-05-26 ENCOUNTER — TELEPHONE (OUTPATIENT)
Dept: INFUSION THERAPY | Age: 56
End: 2021-05-26

## 2021-05-26 NOTE — TELEPHONE ENCOUNTER
BABITA prectiera called to let us know that patient cancelled PET scan and MRI brain and that she has withdrawn the precert request with the insurance company. Patient changing providers to [facility] as it is closer to her home. Hannah Florentino.  Ángela Gonzalez

## 2021-05-28 ENCOUNTER — APPOINTMENT (OUTPATIENT)
Dept: CT IMAGING | Facility: HOSPITAL | Age: 56
End: 2021-05-28

## 2021-05-28 ENCOUNTER — HOSPITAL ENCOUNTER (OUTPATIENT)
Dept: CT IMAGING | Facility: HOSPITAL | Age: 56
End: 2021-05-28

## 2021-05-28 ENCOUNTER — HOSPITAL ENCOUNTER (OUTPATIENT)
Dept: MRI IMAGING | Facility: HOSPITAL | Age: 56
End: 2021-05-28

## 2021-06-10 ENCOUNTER — OFFICE VISIT (OUTPATIENT)
Dept: CARDIOLOGY | Facility: CLINIC | Age: 56
End: 2021-06-10

## 2021-06-10 ENCOUNTER — READMISSION MANAGEMENT (OUTPATIENT)
Dept: CALL CENTER | Facility: HOSPITAL | Age: 56
End: 2021-06-10

## 2021-06-10 VITALS
DIASTOLIC BLOOD PRESSURE: 90 MMHG | HEART RATE: 77 BPM | BODY MASS INDEX: 12.95 KG/M2 | WEIGHT: 68.6 LBS | OXYGEN SATURATION: 98 % | SYSTOLIC BLOOD PRESSURE: 160 MMHG | HEIGHT: 61 IN

## 2021-06-10 DIAGNOSIS — Z72.0 TOBACCO ABUSE: ICD-10-CM

## 2021-06-10 DIAGNOSIS — E78.5 DYSLIPIDEMIA: ICD-10-CM

## 2021-06-10 DIAGNOSIS — I10 ESSENTIAL HYPERTENSION: ICD-10-CM

## 2021-06-10 DIAGNOSIS — I25.10 CORONARY ARTERY DISEASE INVOLVING NATIVE CORONARY ARTERY OF NATIVE HEART WITHOUT ANGINA PECTORIS: Primary | ICD-10-CM

## 2021-06-10 DIAGNOSIS — R91.8 LUNG MASS: ICD-10-CM

## 2021-06-10 PROCEDURE — 99214 OFFICE O/P EST MOD 30 MIN: CPT | Performed by: INTERNAL MEDICINE

## 2021-06-10 RX ORDER — LOSARTAN POTASSIUM 25 MG/1
25 TABLET ORAL DAILY
Qty: 30 TABLET | Refills: 11 | Status: SHIPPED | OUTPATIENT
Start: 2021-06-10 | End: 2021-08-19 | Stop reason: SDUPTHER

## 2021-06-10 NOTE — OUTREACH NOTE
Medical Week 4 Survey      Responses   [facility], [place], operated by [facility] patient discharged from?  [place]   Does the patient have one of the following disease processes/diagnoses(primary or secondary)?  Other   Week 4 attempt successful?  Yes   Call start time  1546   Call end time  1548   Discharge diagnosis   Pneumothorax, traumaticLung mass, concerning for malignancy    Meds reviewed with patient/caregiver?  Yes   Is the patient having any side effects they believe may be caused by any medication additions or changes?  No   Is the patient taking all medications as directed (includes completed medication regime)?  Yes   Has the patient kept scheduled appointments due by today?  Yes   Is the patient still receiving Home Health Services?  N/A   Psychosocial issues?  No   What is the patient's perception of their health status since discharge?  Improving   Is the patient/caregiver able to teach back signs and symptoms related to disease process for when to call PCP?  Yes   Is the patient/caregiver able to teach back signs and symptoms related to disease process for when to call 911?  Yes   Is the patient/caregiver able to teach back the hierarchy of who to call/visit for symptoms/problems? PCP, Specialist, Home health nurse, Urgent Care, ED, 911  Yes   Week 4 Call Completed?  Yes   Would the patient like one additional call?  No   Graduated  Yes   Is the patient interested in additional calls from an ambulatory ?  NOTE:  applies to high risk patients requiring additional follow-up.  No   Did the patient feel the follow up calls were helpful during their recovery period?  Yes   Was the number of calls appropriate?  Yes          Letty Betts RN

## 2021-06-10 NOTE — PROGRESS NOTES
Reason for Visit: cardiovascular follow up.    HPI:  Rin Soler is a 56 y.o. female is here today for follow-up.  She was last seen in April.  At last visit she was significantly anxious due to pending biopsy of a large right lung mass.  She ended up getting this done on 4/29 and was unfortunately complicated by a right pneumothorax.  She reports having persistent side effects from some of the medications.  The biopsy showed stage 3 lung cancer (NSCLC adenocarcinoma subtype) and she is getting ready to start chemotherapy and radiation.  She continues to have the same anterior chest pain.  Her blood pressure was elevated last visit she was started on lisinopril.  She felt the lisinopril made her sleepy and she stopped it.   Her blood pressure still significantly elevated.  She is hoping to quite smoking before she starts chemotherapy.      Previous Cardiac Testing and Procedures:  - Stress echo (08/04/2015) negative for ischemia  - Lipid panel (09/03/2015) 166/47/93/130  - LHC (10/05/2015) 90% mid RCA stenosis status post PCI with 2.25 x 12 mm Xience Alpine IAN, no other coronary artery disease, EF 55% with mild inferior hypokinesis  - Stress echo (03/29/2016) low risk for ischemia, good exercise capacity  - Echo (12/22/2016) EF 60-65%, normal RV size and function, normal diastolic function, all valves structurally and functionally normal  - VESTA (12/22/2016) normal arterial exam of the lower extremities  - Holter monitor (12/22/2016) sinus rhythm with rare atrial ventricular ectopic beats, no significant pauses, arrhythmias, or events, symptoms associated with sinus tachycardia  - Lipid panel (10/3/2017) total cholesterol 106, HDL 53, LDL 31, triglycerides 110  - Exercise nuclear stress (3/13/2019) negative for ischemia, EF 78%  - CT chest (4/9/2021) 4.9 x 2.9 x 5.8 uh right upper lobe lung mass abutting the medial pleura with direct extension into the right hilum and suspected mediastinal invasion with  abutment of the IVC and aortic arch and encasement of the right upper lobe pulmonary arteries and abutment of the right upper lobe bronchus, no definite metastatic disease of the chest, severe emphysema  - Creatinine (4/12/2021) 0.7    Patient Active Problem List   Diagnosis   • Dysphagia   • GERD (gastroesophageal reflux disease)   • Tobacco abuse   • Coronary artery disease involving native coronary artery of native heart without angina pectoris   • Dyslipidemia   • Essential hypertension   • COPD (chronic obstructive pulmonary disease) (CMS/HCC)   • Lung cancer (CMS/HCC)   • Lung mass   • Severe malnutrition (CMS/HCC)   • Postprocedural pneumothorax   • Chronic pain syndrome   • Chronic back pain   • Pressure injury of coccygeal region, stage 2 (CMS/HCC)       Social History     Tobacco Use   • Smoking status: Current Every Day Smoker     Packs/day: 1.50     Years: 20.00     Pack years: 30.00     Types: Cigarettes     Start date: 1976   • Smokeless tobacco: Never Used   Substance Use Topics   • Alcohol use: No   • Drug use: No       Family History   Problem Relation Age of Onset   • Diabetes Mother    • Cancer Father    • Heart disease Father        The following portions of the patient's history were reviewed and updated as appropriate: allergies, current medications, past family history, past medical history, past social history, past surgical history and problem list.      Current Outpatient Medications:   •  aspirin 81 MG EC tablet, Take 81 mg by mouth Daily., Disp: , Rfl:   •  atorvastatin (LIPITOR) 20 MG tablet, Take 1 tablet by mouth Daily., Disp: 30 tablet, Rfl: 11  •  diazePAM (VALIUM) 10 MG tablet, Take 10 mg by mouth 3 (Three) Times a Day As Needed for Anxiety., Disp: , Rfl: 1  •  HYDROcodone-acetaminophen (NORCO)  MG per tablet, Take 1 tablet by mouth Every 4 (Four) Hours As Needed for Moderate Pain  or Severe Pain . Every 4 to 6 hours prn, Disp: , Rfl: 0  •  isosorbide mononitrate (IMDUR) 30 MG  "24 hr tablet, Take 1 tablet by mouth Daily., Disp: 30 tablet, Rfl: 11  •  metoprolol tartrate (LOPRESSOR) 25 MG tablet, Take 0.5 tablets by mouth 2 (Two) Times a Day., Disp: 60 tablet, Rfl: 11  •  nitroglycerin (NITROSTAT) 0.4 MG SL tablet, Place 1 tablet under the tongue Every 5 (Five) Minutes As Needed for Chest Pain. Take no more than 3 doses in 15 minutes., Disp: 25 tablet, Rfl: 5  •  pantoprazole (PROTONIX) 40 MG EC tablet, Take 40 mg by mouth Daily., Disp: , Rfl: 11  •  losartan (Cozaar) 25 MG tablet, Take 1 tablet by mouth Daily., Disp: 30 tablet, Rfl: 11    Review of Systems   Constitutional: Negative for chills and fever.   Cardiovascular: Positive for chest pain. Negative for paroxysmal nocturnal dyspnea.   Respiratory: Positive for shortness of breath. Negative for cough.    Skin: Negative for rash.   Gastrointestinal: Negative for abdominal pain and heartburn.   Neurological: Negative for dizziness and numbness.       Objective   /90 (BP Location: Left arm, Patient Position: Sitting, Cuff Size: Adult)   Pulse 77   Ht 154.9 cm (60.98\")   Wt 31.1 kg (68 lb 9.6 oz)   SpO2 98%   BMI 12.97 kg/m²   Constitutional:       Appearance: Well-developed and underweight. Frail.   HENT:      Head: Normocephalic and atraumatic.   Pulmonary:      Effort: Pulmonary effort is normal.      Breath sounds: Normal breath sounds.   Cardiovascular:      Normal rate. Regular rhythm.      Murmurs: There is no murmur.      No gallop.   Edema:     Peripheral edema absent.   Skin:     General: Skin is warm and dry.   Neurological:      Mental Status: Alert and oriented to person, place, and time.       Procedures      ICD-10-CM ICD-9-CM   1. Coronary artery disease involving native coronary artery of native heart without angina pectoris  I25.10 414.01   2. Tobacco abuse  Z72.0 305.1   3. Dyslipidemia  E78.5 272.4   4. Essential hypertension  I10 401.9   5. Lung mass  R91.8 786.6         Assessment/Plan:  1. Coronary artery " disease: Status post PCI to RCA 10/2015.  She had a negative stress echo 3/2016 and negative nuclear stress 3/2019.  Atypical chest pain symptoms felt to be due to her large lung mass.  Continue aspirin, atorvastatin, Imdur, metoprolol, and nitroglycerin.       2.  Tobacco abuse: Rin Soler  reports that she has been smoking cigarettes. She started smoking about 45 years ago. She has a 30.00 pack-year smoking history. She has never used smokeless tobacco.. I have educated her on the risk of diseases from using tobacco products such as cancer, COPD and heart disease.  I advised her to quit and she is willing to quit. We have discussed the following method/s for tobacco cessation:  [place].  Together we have set a quit date for 2 weeks from today.  She will follow up with me in 4 months or sooner to check on her progress.  I spent 4 minutes counseling the patient.     3.  Dyslipidemia: Continue atorvastatin.  Obtain copy of last lipid panel from PCP.     4.  Essential hypertension: Blood pressure remains elevated today.  Intolerant to lisinopril.  Start losartan.       5.  Lung mass: Non-small cell lung cancer stage III.  She is getting ready to start chemotherapy and radiation.

## 2021-06-25 ENCOUNTER — TELEPHONE (OUTPATIENT)
Dept: CARDIOLOGY | Facility: CLINIC | Age: 56
End: 2021-06-25

## 2021-06-25 NOTE — TELEPHONE ENCOUNTER
Patient called today inquiring whether or not she needed to hold aspirin prior to her appt on Monday for port placement.  I told patient due to such late notice, 4pm on Friday afternoon, that it would most likely be Monday afternoon before we could get back to her.    Please advise if patient can hold aspirin prior to procedure.    Thank you

## 2021-06-28 NOTE — TELEPHONE ENCOUNTER
Decision on whether or not she needs to hold aspirin for procedure should be determined by the provider performing the procedure.  From a cardiac standpoint I would recommend she remain on aspirin if at all possible given her history of coronary artery disease.

## 2021-07-21 ENCOUNTER — OUTSIDE FACILITY SERVICE (OUTPATIENT)
Dept: CARDIOLOGY | Facility: CLINIC | Age: 56
End: 2021-07-21

## 2021-07-21 PROCEDURE — 99233 SBSQ HOSP IP/OBS HIGH 50: CPT | Performed by: INTERNAL MEDICINE

## 2021-07-21 PROCEDURE — 99233 SBSQ HOSP IP/OBS HIGH 50: CPT | Performed by: NURSE PRACTITIONER

## 2021-07-22 PROCEDURE — 99233 SBSQ HOSP IP/OBS HIGH 50: CPT | Performed by: INTERNAL MEDICINE

## 2021-07-22 PROCEDURE — 99233 SBSQ HOSP IP/OBS HIGH 50: CPT | Performed by: NURSE PRACTITIONER

## 2021-08-19 ENCOUNTER — OFFICE VISIT (OUTPATIENT)
Dept: CARDIOLOGY | Facility: CLINIC | Age: 56
End: 2021-08-19

## 2021-08-19 VITALS
HEART RATE: 95 BPM | WEIGHT: 69 LBS | HEIGHT: 61 IN | DIASTOLIC BLOOD PRESSURE: 80 MMHG | SYSTOLIC BLOOD PRESSURE: 110 MMHG | OXYGEN SATURATION: 99 % | BODY MASS INDEX: 13.03 KG/M2

## 2021-08-19 DIAGNOSIS — Z72.0 TOBACCO ABUSE: ICD-10-CM

## 2021-08-19 DIAGNOSIS — I25.10 CORONARY ARTERY DISEASE INVOLVING NATIVE CORONARY ARTERY OF NATIVE HEART WITHOUT ANGINA PECTORIS: Primary | ICD-10-CM

## 2021-08-19 DIAGNOSIS — E78.5 DYSLIPIDEMIA: ICD-10-CM

## 2021-08-19 DIAGNOSIS — R91.8 LUNG MASS: ICD-10-CM

## 2021-08-19 DIAGNOSIS — I10 ESSENTIAL HYPERTENSION: ICD-10-CM

## 2021-08-19 PROBLEM — D69.6 THROMBOCYTOPENIA (HCC): Status: ACTIVE | Noted: 2021-08-19

## 2021-08-19 PROCEDURE — 99214 OFFICE O/P EST MOD 30 MIN: CPT | Performed by: INTERNAL MEDICINE

## 2021-08-19 RX ORDER — LOSARTAN POTASSIUM 25 MG/1
12.5 TABLET ORAL DAILY
Qty: 15 TABLET | Refills: 11 | Status: SHIPPED | OUTPATIENT
Start: 2021-08-19 | End: 2022-05-12 | Stop reason: SDUPTHER

## 2021-08-19 NOTE — PROGRESS NOTES
Reason for Visit: cardiovascular follow up.    HPI:  Rin Soler is a 56 y.o. female is here today for hospital follow-up.  She was recently admitted to [facility] on 7/21/2021 with complaints of chest pain.  Elevated troponin increased up to 1.275.  She has an elevated proBNP.  Due to her frailty, lung cancer, and thrombocytopenia she has managed medically.  Is given aspirin, Imdur, metoprolol, and losartan.  She did not have any recurrent chest pain during hospitalization was subsequently discharged home.    She has not been feeling well lately.  She gets out of breath very easily.  She feels dizzy when she falls over.  She has a lot of back pain.  She has a hard time walking and moving around the pain is so severe.  She feels sick to her stomach and nauseated after eating.  She has also reported a couple of episodes of back pain.  She reports chemotherapy and radiation are currently on hold.      Previous Cardiac Testing and Procedures:  - Stress echo (08/04/2015) negative for ischemia  - Lipid panel (09/03/2015) 166/47/93/130  - [facility] (10/05/2015) 90% mid RCA stenosis status post PCI with 2.25 x 12 mm Xience Alpine IAN, no other coronary artery disease, EF 55% with mild inferior hypokinesis  - Stress echo (03/29/2016) low risk for ischemia, good exercise capacity  - Echo (12/22/2016) EF 60-65%, normal RV size and function, normal diastolic function, all valves structurally and functionally normal  - VESTA (12/22/2016) normal arterial exam of the lower extremities  - Holter monitor (12/22/2016) sinus rhythm with rare atrial ventricular ectopic beats, no significant pauses, arrhythmias, or events, symptoms associated with sinus tachycardia  - Lipid panel (10/3/2017) total cholesterol 106, HDL 53, LDL 31, triglycerides 110  - Exercise nuclear stress (3/13/2019) negative for ischemia, EF 78%  - Lipid panel (3/5/2021) total cholesterol 85, HDL 45, LDL 25, triglycerides 73  - CT chest (4/9/2021)  4.9 x 2.9 x 5.8 uh right upper lobe lung mass abutting the medial pleura with direct extension into the right hilum and suspected mediastinal invasion with abutment of the IVC and aortic arch and encasement of the right upper lobe pulmonary arteries and abutment of the right upper lobe bronchus, no definite metastatic disease of the chest, severe emphysema  - Creatinine (4/12/2021) 0.7  - Echo (6/3/2021) EF 55-60%    Patient Active Problem List   Diagnosis   • Dysphagia   • GERD (gastroesophageal reflux disease)   • Tobacco abuse   • Coronary artery disease involving native coronary artery of native heart without angina pectoris   • Dyslipidemia   • Essential hypertension   • COPD (chronic obstructive pulmonary disease) (CMS/HCC)   • Lung cancer (CMS/HCC)   • Lung mass   • Severe malnutrition (CMS/HCC)   • Postprocedural pneumothorax   • Chronic pain syndrome   • Chronic back pain   • Pressure injury of coccygeal region, stage 2 (CMS/HCC)   • Thrombocytopenia (CMS/HCC)       Social History     Tobacco Use   • Smoking status: Current Every Day Smoker     Packs/day: 1.50     Years: 20.00     Pack years: 30.00     Types: Cigarettes     Start date: 1976   • Smokeless tobacco: Never Used   Substance Use Topics   • Alcohol use: No   • Drug use: No       Family History   Problem Relation Age of Onset   • Diabetes Mother    • Cancer Father    • Heart disease Father        The following portions of the patient's history were reviewed and updated as appropriate: allergies, current medications, past family history, past medical history, past social history, past surgical history and problem list.      Current Outpatient Medications:   •  aspirin 81 MG EC tablet, Take 81 mg by mouth Daily., Disp: , Rfl:   •  atorvastatin (LIPITOR) 20 MG tablet, Take 1 tablet by mouth Daily., Disp: 30 tablet, Rfl: 11  •  diazePAM (VALIUM) 10 MG tablet, Take 10 mg by mouth 3 (Three) Times a Day As Needed for Anxiety., Disp: , Rfl: 1  •   "HYDROcodone-acetaminophen (NORCO)  MG per tablet, Take 1 tablet by mouth Every 4 (Four) Hours As Needed for Moderate Pain  or Severe Pain . Every 4 to 6 hours prn, Disp: , Rfl: 0  •  isosorbide mononitrate (IMDUR) 30 MG 24 hr tablet, Take 1 tablet by mouth Daily., Disp: 30 tablet, Rfl: 11  •  losartan (Cozaar) 25 MG tablet, Take 0.5 tablets by mouth Daily., Disp: 15 tablet, Rfl: 11  •  metoprolol tartrate (LOPRESSOR) 25 MG tablet, Take 0.5 tablets by mouth 2 (Two) Times a Day. (Patient taking differently: Take 25 mg by mouth Daily.), Disp: 60 tablet, Rfl: 11  •  pantoprazole (PROTONIX) 40 MG EC tablet, Take 40 mg by mouth Daily., Disp: , Rfl: 11  •  nitroglycerin (NITROSTAT) 0.4 MG SL tablet, Place 1 tablet under the tongue Every 5 (Five) Minutes As Needed for Chest Pain. Take no more than 3 doses in 15 minutes., Disp: 25 tablet, Rfl: 5    Review of Systems   Constitutional: Positive for malaise/fatigue. Negative for chills and fever.   Cardiovascular: Positive for chest pain and dyspnea on exertion. Negative for paroxysmal nocturnal dyspnea.   Respiratory: Positive for shortness of breath. Negative for cough.    Skin: Negative for rash.   Musculoskeletal: Positive for back pain.   Gastrointestinal: Negative for abdominal pain and heartburn.   Neurological: Positive for dizziness. Negative for numbness.       Objective   /80 (BP Location: Left arm, Patient Position: Sitting, Cuff Size: Adult)   Pulse 95   Ht 154.9 cm (60.98\")   Wt 31.3 kg (69 lb)   SpO2 99%   BMI 13.04 kg/m²   Constitutional:       Appearance: Well-developed and underweight. Frail. Chronically ill-appearing.   HENT:      Head: Normocephalic and atraumatic.   Pulmonary:      Effort: Pulmonary effort is normal.      Breath sounds: Normal breath sounds.   Cardiovascular:      Normal rate. Regular rhythm.      Murmurs: There is no murmur.      No gallop. No click.   Edema:     Peripheral edema absent.   Skin:     General: Skin is warm " and dry.   Neurological:      Mental Status: Alert and oriented to person, place, and time.       Procedures      ICD-10-CM ICD-9-CM   1. Coronary artery disease involving native coronary artery of native heart without angina pectoris  I25.10 414.01   2. Tobacco abuse  Z72.0 305.1   3. Dyslipidemia  E78.5 272.4   4. Essential hypertension  I10 401.9   5. Lung mass  R91.8 786.6         Assessment/Plan:  1.  Coronary artery disease: History of PCI to RCA 10/2015.  Recent NSTEMI at [facility] on 7/20/2021.  She continues to have chest pain episodes, some of which may be related to her lung cancer. Recommend medical therapy given her frailty, lung cancer, and thrombocytopenia.  Continue aspirin, atorvastatin, Imdur, metoprolol, and nitroglycerin.       2.  Tobacco abuse: Rin Soler  reports that she has been smoking cigarettes. She started smoking about 45 years ago. She has a 30.00 pack-year smoking history. She has never used smokeless tobacco.. I have educated her on the risk of diseases from using tobacco products such as cancer, COPD and heart disease.   I advised her to quit and she is not willing to quit.  I spent 3.5 minutes counseling the patient.     3.  Dyslipidemia: Well controlled on lipid panel from 3/5/2021.  Continue atorvastatin.       4.  Essential hypertension: Blood pressure is low normal today.  She does not seem to tolerate it at this level.  Decrease losartan down to 12.5 mg.       5.  Lung mass: Non-small cell lung cancer stage III.  Chemotherapy and radiation therapy currently on hold.     6.  Thrombocytopenia: Platelets down to 42 on 7/21/2021.

## 2021-09-09 ENCOUNTER — TELEPHONE (OUTPATIENT)
Dept: CARDIOLOGY | Facility: CLINIC | Age: 56
End: 2021-09-09

## 2021-09-09 NOTE — TELEPHONE ENCOUNTER
Patient contacted office again stating she is still experiencing chest/rib/ left sided pain. She also states she has a headache and is fatigued. Still has not taken any nitroglycerin. She is still concerned regarding her BP.  I advised patient to reach out to oncologist regarding pain, side effects of radiation, etc.   Advised patient again to go to the ED if she has unresolved chest pain or other symptoms.  She voiced understanding.    Awaiting advise on elevated bp.    Thank you

## 2021-09-09 NOTE — TELEPHONE ENCOUNTER
9-9-21  145/104      9-8-21  155/102    152/107    146/95    125/79  HR 86    Patient called and stated she is taking a 25mg losartan in morning and 25 mg metoprolol at night.    She is scared her bp is high. Patient is experiencing left sided chest pain as well. She stated she has not taken any nitroglycerin for the pain.    Patient also has a compressed disc in her spine. She did not know if the increase pain could contribute to the higher bp readings.  She stated she also started radiation again this week.    Please advise.

## 2021-09-10 ENCOUNTER — TELEPHONE (OUTPATIENT)
Dept: CARDIOLOGY | Facility: CLINIC | Age: 56
End: 2021-09-10

## 2021-09-10 DIAGNOSIS — I25.119 CORONARY ARTERY DISEASE INVOLVING NATIVE CORONARY ARTERY OF NATIVE HEART WITH ANGINA PECTORIS (HCC): ICD-10-CM

## 2021-09-10 RX ORDER — ISOSORBIDE MONONITRATE 60 MG/1
60 TABLET, EXTENDED RELEASE ORAL DAILY
Qty: 30 TABLET | Refills: 11 | Status: SHIPPED | OUTPATIENT
Start: 2021-09-10 | End: 2022-11-14 | Stop reason: SDUPTHER

## 2021-09-10 NOTE — TELEPHONE ENCOUNTER
Much of her pain is felt to be related to her lung cancer.  I agree with her discussing pain control with her oncologist.  If there is concern for cardiac cause of her chest pain and she can take a nitroglycerin.  Due to her high blood pressure and chest pain I will increase her metoprolol up to 25 mg twice daily and she can increase this to 50 twice daily if she notices improvement.  I will increase her Imdur up to 60 mg.

## 2021-09-10 NOTE — TELEPHONE ENCOUNTER
Patient called office again, I reiterated to her the medication changes and advised her again to call next week and let us know what her bp readings are and how she is feeling.  Advised again to go to er over the weekend if unresolved chest pain.  Told patient if she wanted to bring her bp monitor to the office next week we could compare her home cuff to ours here in the office.  She voiced understanding.

## 2021-09-10 NOTE — TELEPHONE ENCOUNTER
Patient voiced understanding.  Advised her to call back in a few days if sees improvement. Advised to go to ED if severe symptoms or if nitro does not help relieve chest pain.

## 2021-09-20 ENCOUNTER — TELEPHONE (OUTPATIENT)
Dept: NEUROSURGERY | Facility: CLINIC | Age: 56
End: 2021-09-20

## 2021-10-04 ENCOUNTER — TELEPHONE (OUTPATIENT)
Dept: NEUROSURGERY | Facility: CLINIC | Age: 56
End: 2021-10-04

## 2021-10-04 NOTE — TELEPHONE ENCOUNTER
Left vm for patient to call the office back to schedule her first appt in our office with FOX Thakkar for her acute compression fx.     See referral communications.

## 2021-10-14 ENCOUNTER — OFFICE VISIT (OUTPATIENT)
Dept: NEUROSURGERY | Facility: CLINIC | Age: 56
End: 2021-10-14

## 2021-10-14 VITALS
DIASTOLIC BLOOD PRESSURE: 62 MMHG | SYSTOLIC BLOOD PRESSURE: 130 MMHG | WEIGHT: 67.8 LBS | HEIGHT: 60 IN | BODY MASS INDEX: 13.31 KG/M2

## 2021-10-14 DIAGNOSIS — R63.6 PATIENT UNDERWEIGHT: ICD-10-CM

## 2021-10-14 DIAGNOSIS — F17.200 SMOKER: ICD-10-CM

## 2021-10-14 DIAGNOSIS — S22.060A COMPRESSION FRACTURE OF T8 VERTEBRA, INITIAL ENCOUNTER (HCC): Primary | ICD-10-CM

## 2021-10-14 PROCEDURE — 99213 OFFICE O/P EST LOW 20 MIN: CPT | Performed by: NURSE PRACTITIONER

## 2021-10-14 RX ORDER — FOLIC ACID 1 MG/1
1 TABLET ORAL DAILY
COMMUNITY

## 2021-10-14 NOTE — PATIENT INSTRUCTIONS
"BMI for Adults  What is BMI?  Body mass index (BMI) is a number that is calculated from a person's weight and height. BMI can help estimate how much of a person's weight is composed of fat. BMI does not measure body fat directly. Rather, it is an alternative to procedures that directly measure body fat, which can be difficult and expensive.  BMI can help identify people who may be at higher risk for certain medical problems.  What are BMI measurements used for?  BMI is used as a screening tool to identify possible weight problems. It helps determine whether a person is obese, overweight, a healthy weight, or underweight.  BMI is useful for:  · Identifying a weight problem that may be related to a medical condition or may increase the risk for medical problems.  · Promoting changes, such as changes in diet and exercise, to help reach a healthy weight. BMI screening can be repeated to see if these changes are working.  How is BMI calculated?  BMI involves measuring your weight in relation to your height. Both height and weight are measured, and the BMI is calculated from those numbers. This can be done either in English ([place] or metric measurements. Note that charts and online BMI calculators are available to help you find your BMI quickly and easily without having to do these calculations yourself.  To calculate your BMI in English ([place] measurements:    1. Measure your weight in pounds (lb).  2. Multiply the number of pounds by 703.  ? For example, for a person who weighs 180 lb, multiply that number by 703, which equals 126,540.  3. Measure your height in inches. Then multiply that number by itself to get a measurement called \"inches squared.\"  ? For example, for a person who is 70 inches tall, the \"inches squared\" measurement is 70 inches x 70 inches, which equals 4,900 inches squared.  4. Divide the total from step 2 (number of lb x 703) by the total from step 3 (inches squared): 126,540 ÷ 4,900 = 25.8. This is " "your BMI.    To calculate your BMI in metric measurements:  1. Measure your weight in kilograms (kg).  2. Measure your height in meters (m). Then multiply that number by itself to get a measurement called \"meters squared.\"  ? For example, for a person who is 1.75 m tall, the \"meters squared\" measurement is 1.75 m x 1.75 m, which is equal to 3.1 meters squared.  3. Divide the number of kilograms (your weight) by the meters squared number. In this example: 70 ÷ 3.1 = 22.6. This is your BMI.  What do the results mean?  BMI charts are used to identify whether you are underweight, normal weight, overweight, or obese. The following guidelines will be used:  · Underweight: BMI less than 18.5.  · Normal weight: BMI between 18.5 and 24.9.  · Overweight: BMI between 25 and 29.9.  · Obese: BMI of 30 or above.  Keep these notes in mind:  · Weight includes both fat and muscle, so someone with a muscular build, such as an athlete, may have a BMI that is higher than 24.9. In cases like these, BMI is not an accurate measure of body fat.  · To determine if excess body fat is the cause of a BMI of 25 or higher, further assessments may need to be done by a health care provider.  · BMI is usually interpreted in the same way for men and women.  Where to find more information  For more information about BMI, including tools to quickly calculate your BMI, go to these websites:  · Centers for Disease Control and Prevention: www.cdc.gov  · American Heart Association: www.heart.org  · National Heart, Lung, and Blood [place]: www.nhlbi.nih.gov  Summary  · Body mass index (BMI) is a number that is calculated from a person's weight and height.  · BMI may help estimate how much of a person's weight is composed of fat. BMI can help identify those who may be at higher risk for certain medical problems.  · BMI can be measured using English measurements or metric measurements.  · BMI charts are used to identify whether you are underweight, normal " "weight, overweight, or obese.  This information is not intended to replace advice given to you by your health care provider. Make sure you discuss any questions you have with your health care provider.  Document Revised: 09/09/2020 Document Reviewed: 07/17/2020  [employer] Patient Education © 2021 [employer]      https://www.nhlbi.nih.gov/files/docs/public/heart/dash_brief.pdf\">   DASH Eating Plan  DASH stands for Dietary Approaches to Stop Hypertension. The DASH eating plan is a healthy eating plan that has been shown to:  · Reduce high blood pressure (hypertension).  · Reduce your risk for type 2 diabetes, heart disease, and stroke.  · Help with weight loss.  What are tips for following this plan?  Reading food labels  · Check food labels for the amount of salt (sodium) per serving. Choose foods with less than 5 percent of the Daily Value of sodium. Generally, foods with less than 300 milligrams (mg) of sodium per serving fit into this eating plan.  · To find whole grains, look for the word \"whole\" as the first word in the ingredient list.  Shopping  · Buy products labeled as \"low-sodium\" or \"no salt added.\"  · Buy fresh foods. Avoid canned foods and pre-made or frozen meals.  Cooking  · Avoid adding salt when cooking. Use salt-free seasonings or herbs instead of table salt or sea salt. Check with your health care provider or pharmacist before using salt substitutes.  · Do not morin foods. Cook foods using healthy methods such as baking, boiling, grilling, roasting, and broiling instead.  · Cook with heart-healthy oils, such as olive, canola, avocado, soybean, or sunflower oil.  Meal planning    · Eat a balanced diet that includes:  ? 4 or more servings of fruits and 4 or more servings of vegetables each day. Try to fill one-half of your plate with fruits and vegetables.  ? 6-8 servings of whole grains each day.  ? Less than 6 oz (170 g) of lean meat, poultry, or fish each day. A 3-oz (85-g) serving of meat is " about the same size as a deck of cards. One egg equals 1 oz (28 g).  ? 2-3 servings of low-fat dairy each day. One serving is 1 cup (237 mL).  ? 1 serving of nuts, seeds, or beans 5 times each week.  ? 2-3 servings of heart-healthy fats. Healthy fats called omega-3 fatty acids are found in foods such as walnuts, flaxseeds, fortified milks, and eggs. These fats are also found in cold-water fish, such as sardines, salmon, and mackerel.  · Limit how much you eat of:  ? Canned or prepackaged foods.  ? Food that is high in trans fat, such as some fried foods.  ? Food that is high in saturated fat, such as fatty meat.  ? Desserts and other sweets, sugary drinks, and other foods with added sugar.  ? Full-fat dairy products.  · Do not salt foods before eating.  · Do not eat more than 4 egg yolks a week.  · Try to eat at least 2 vegetarian meals a week.  · Eat more home-cooked food and less restaurant, buffet, and fast food.    Lifestyle  · When eating at a restaurant, ask that your food be prepared with less salt or no salt, if possible.  · If you drink alcohol:  ? Limit how much you use to:  § 0-1 drink a day for women who are not pregnant.  § 0-2 drinks a day for men.  ? Be aware of how much alcohol is in your drink. In the [place] one drink equals one 12 oz bottle of beer (355 mL), one 5 oz glass of wine (148 mL), or one 1½ oz glass of hard liquor (44 mL).  General information  · Avoid eating more than 2,300 mg of salt a day. If you have hypertension, you may need to reduce your sodium intake to 1,500 mg a day.  · Work with your health care provider to maintain a healthy body weight or to lose weight. Ask what an ideal weight is for you.  · Get at least 30 minutes of exercise that causes your heart to beat faster (aerobic exercise) most days of the week. Activities may include walking, swimming, or biking.  · Work with your health care provider or dietitian to adjust your eating plan to your individual calorie needs.  What  foods should I eat?  Fruits  All fresh, dried, or frozen fruit. Canned fruit in natural juice (without added sugar).  Vegetables  Fresh or frozen vegetables (raw, steamed, roasted, or grilled). Low-sodium or reduced-sodium tomato and vegetable juice. Low-sodium or reduced-sodium tomato sauce and tomato paste. Low-sodium or reduced-sodium canned vegetables.  Grains  Whole-grain or whole-wheat bread. Whole-grain or whole-wheat pasta. Brown rice. Oatmeal. Quinoa. Bulgur. Whole-grain and low-sodium cereals. Ting bread. Low-fat, low-sodium crackers. Whole-wheat flour tortillas.  Meats and other proteins  Skinless chicken or turkey. Ground chicken or turkey. Pork with fat trimmed off. Fish and seafood. Egg whites. Dried beans, peas, or lentils. Unsalted nuts, nut butters, and seeds. Unsalted canned beans. Lean cuts of beef with fat trimmed off. Low-sodium, lean precooked or cured meat, such as sausages or meat loaves.  Dairy  Low-fat (1%) or fat-free (skim) milk. Reduced-fat, low-fat, or fat-free cheeses. Nonfat, low-sodium ricotta or cottage cheese. Low-fat or nonfat yogurt. Low-fat, low-sodium cheese.  Fats and oils  Soft margarine without trans fats. Vegetable oil. Reduced-fat, low-fat, or light mayonnaise and salad dressings (reduced-sodium). Canola, safflower, olive, avocado, soybean, and sunflower oils. Avocado.  Seasonings and condiments  Herbs. Spices. Seasoning mixes without salt.  Other foods  Unsalted popcorn and pretzels. Fat-free sweets.  The items listed above may not be a complete list of foods and beverages you can eat. Contact a dietitian for more information.  What foods should I avoid?  Fruits  Canned fruit in a light or heavy syrup. Fried fruit. Fruit in cream or butter sauce.  Vegetables  Creamed or fried vegetables. Vegetables in a cheese sauce. Regular canned vegetables (not low-sodium or reduced-sodium). Regular canned tomato sauce and paste (not low-sodium or reduced-sodium). Regular tomato and  vegetable juice (not low-sodium or reduced-sodium). Pickles. Olives.  Grains  Baked goods made with fat, such as croissants, muffins, or some breads. Dry pasta or rice meal packs.  Meats and other proteins  Fatty cuts of meat. Ribs. Fried meat. Mccall. Bologna, salami, and other precooked or cured meats, such as sausages or meat loaves. Fat from the back of a pig (fatback). Bratwurst. Salted nuts and seeds. Canned beans with added salt. Canned or smoked fish. Whole eggs or egg yolks. Chicken or turkey with skin.  Dairy  Whole or 2% milk, cream, and half-and-half. Whole or full-fat cream cheese. Whole-fat or sweetened yogurt. Full-fat cheese. Nondairy creamers. Whipped toppings. Processed cheese and cheese spreads.  Fats and oils  Butter. Stick margarine. Lard. Shortening. Ghee. Mccall fat. Tropical oils, such as coconut, palm kernel, or palm oil.  Seasonings and condiments  Onion salt, garlic salt, seasoned salt, table salt, and sea salt. Worcestershire sauce. Tartar sauce. Barbecue sauce. Teriyaki sauce. Soy sauce, including reduced-sodium. Steak sauce. Canned and packaged gravies. Fish sauce. Oyster sauce. Cocktail sauce. Store-bought horseradish. Ketchup. Mustard. Meat flavorings and tenderizers. Bouillon cubes. Hot sauces. Pre-made or packaged marinades. Pre-made or packaged taco seasonings. Relishes. Regular salad dressings.  Other foods  Salted popcorn and pretzels.  The items listed above may not be a complete list of foods and beverages you should avoid. Contact a dietitian for more information.  Where to find more information  · National Heart, Lung, and Blood [place]: www.nhlbi.nih.gov  · American Heart Association: www.heart.org  · Academy of Nutrition and Dietetics: www.eatright.org  · National Kidney Foundation: www.kidney.org  Summary  · The DASH eating plan is a healthy eating plan that has been shown to reduce high blood pressure (hypertension). It may also reduce your risk for type 2 diabetes, heart  disease, and stroke.  · When on the DASH eating plan, aim to eat more fresh fruits and vegetables, whole grains, lean proteins, low-fat dairy, and heart-healthy fats.  · With the DASH eating plan, you should limit salt (sodium) intake to 2,300 mg a day. If you have hypertension, you may need to reduce your sodium intake to 1,500 mg a day.  · Work with your health care provider or dietitian to adjust your eating plan to your individual calorie needs.  This information is not intended to replace advice given to you by your health care provider. Make sure you discuss any questions you have with your health care provider.  Document Revised: 11/20/2020 Document Reviewed: 11/20/2020  [employer] Patient Education © 2021 [employer]      Steps to Quit Smoking  Smoking tobacco is the leading cause of preventable death. It can affect almost every organ in the body. Smoking puts you and people around you at risk for many serious, long-lasting (chronic) diseases. Quitting smoking can be hard, but it is one of the best things that you can do for your health. It is never too late to quit.  How do I get ready to quit?  When you decide to quit smoking, make a plan to help you succeed. Before you quit:  · Pick a date to quit. Set a date within the next 2 weeks to give you time to prepare.  · Write down the reasons why you are quitting. Keep this list in places where you will see it often.  · Tell your family, friends, and co-workers that you are quitting. Their support is important.  · Talk with your doctor about the choices that may help you quit.  · Find out if your health insurance will pay for these treatments.  · Know the people, places, things, and activities that make you want to smoke (triggers). Avoid them.  What first steps can I take to quit smoking?  · Throw away all cigarettes at home, at work, and in your car.  · Throw away the things that you use when you smoke, such as ashtrays and lighters.  · Clean your car. Make  sure to empty the ashtray.  · Clean your home, including curtains and carpets.  What can I do to help me quit smoking?  Talk with your doctor about taking medicines and seeing a counselor at the same time. You are more likely to succeed when you do both.  · If you are pregnant or breastfeeding, talk with your doctor about counseling or other ways to quit smoking. Do not take medicine to help you quit smoking unless your doctor tells you to do so.  To quit smoking:  Quit right away  · Quit smoking totally, instead of slowly cutting back on how much you smoke over a period of time.  · Go to counseling. You are more likely to quit if you go to counseling sessions regularly.  Take medicine  You may take medicines to help you quit. Some medicines need a prescription, and some you can buy over-the-counter. Some medicines may contain a drug called nicotine to replace the nicotine in cigarettes. Medicines may:  · Help you to stop having the desire to smoke (cravings).  · Help to stop the problems that come when you stop smoking (withdrawal symptoms).  Your doctor may ask you to use:  · Nicotine patches, gum, or lozenges.  · Nicotine inhalers or sprays.  · Non-nicotine medicine that is taken by mouth.  Find resources  Find resources and other ways to help you quit smoking and remain smoke-free after you quit. These resources are most helpful when you use them often. They include:  · Online chats with a counselor.  · Phone quitlines.  · Printed self-help materials.  · Support groups or group counseling.  · Text messaging programs.  · Mobile phone apps. Use apps on your mobile phone or tablet that can help you stick to your quit plan. There are many free apps for mobile phones and tablets as well as websites. Examples include Quit Guide from the CDC and smokefree.gov    What things can I do to make it easier to quit?    · Talk to your family and friends. Ask them to support and encourage you.  · Call a phone quitline  (1-800-QUIT-NOW), reach out to support groups, or work with a counselor.  · Ask people who smoke to not smoke around you.  · Avoid places that make you want to smoke, such as:  ? Bars.  ? Parties.  ? Smoke-break areas at work.  · Spend time with people who do not smoke.  · Lower the stress in your life. Stress can make you want to smoke. Try these things to help your stress:  ? Getting regular exercise.  ? Doing deep-breathing exercises.  ? Doing yoga.  ? Meditating.  ? Doing a body scan. To do this, close your eyes, focus on one area of your body at a time from head to toe. Notice which parts of your body are tense. Try to relax the muscles in those areas.  How will I feel when I quit smoking?  Day 1 to 3 weeks  Within the first 24 hours, you may start to have some problems that come from quitting tobacco. These problems are very bad 2-3 days after you quit, but they do not often last for more than 2-3 weeks. You may get these symptoms:  · Mood swings.  · Feeling restless, nervous, angry, or annoyed.  · Trouble concentrating.  · Dizziness.  · Strong desire for high-sugar foods and nicotine.  · Weight gain.  · Trouble pooping (constipation).  · Feeling like you may vomit (nausea).  · Coughing or a sore throat.  · Changes in how the medicines that you take for other issues work in your body.  · Depression.  · Trouble sleeping (insomnia).  Week 3 and afterward  After the first 2-3 weeks of quitting, you may start to notice more positive results, such as:  · Better sense of smell and taste.  · Less coughing and sore throat.  · Slower heart rate.  · Lower blood pressure.  · Clearer skin.  · Better breathing.  · Fewer sick days.  Quitting smoking can be hard. Do not give up if you fail the first time. Some people need to try a few times before they succeed. Do your best to stick to your quit plan, and talk with your doctor if you have any questions or concerns.  Summary  · Smoking tobacco is the leading cause of  preventable death. Quitting smoking can be hard, but it is one of the best things that you can do for your health.  · When you decide to quit smoking, make a plan to help you succeed.  · Quit smoking right away, not slowly over a period of time.  · When you start quitting, seek help from your doctor, family, or friends.  This information is not intended to replace advice given to you by your health care provider. Make sure you discuss any questions you have with your health care provider.  Document Revised: 09/11/2020 Document Reviewed: 03/07/2020  [employer] Patient Education © 2021 [employer]

## 2021-10-14 NOTE — PROGRESS NOTES
Chief complaint:   Chief Complaint   Patient presents with   • Back Pain     Rin has been referred here today for follow up for a compression fracture, she brings a CT scan from [facility] for review.  She is not in a brace.        Subjective     HPI: This is a 56-year-old female patient who was referred to us by Dr. Josr Figueredo for thoracic back pain.  She is here to be evaluated today.  The patient was diagnosed with lung cancer 5 years ago and due to the unfortunate set of circumstances she did not seek treatment until a year ago.  She did have one round of chemotherapy and says that she did have a heart attack from the chemotherapy.  She has not had any more chemotherapy and has been doing radiation.  She says about 2 months ago she started having pain in her mid back around her bra line.  This pain is constant.  Is worse with activity.  Nothing makes it better.  She is not complain of any arm pain or lower extremity pain.  Denies any numbness or tingling.  Denies any bowel or bladder incontinence.  She has not done any recent physical therapy, chiropractic care, or pain management injections.  She does smoke 2 packs of cigarettes a day.  Denies any alcohol or illicit drug use.  She is right-hand dominant.  She is currently not working.  She is single.  Rates her pain on a scale 0-10 at nine.    Review of Systems   Constitutional: Positive for activity change and appetite change.   HENT: Positive for ear pain, hearing loss and trouble swallowing.    Respiratory: Positive for shortness of breath.    Cardiovascular: Positive for chest pain.   Musculoskeletal: Positive for back pain, neck pain and neck stiffness.   Neurological: Positive for numbness and headaches.   Hematological: Bruises/bleeds easily.   Psychiatric/Behavioral: Positive for sleep disturbance. The patient is nervous/anxious.    All other systems reviewed and are negative.       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    • Costochondritis    • Costochondritis, acute    • Deviated nasal septum     left   • Dyspnea on exertion    • Emphysema of lung (HCC)    • Endometriosis    • Fibromyalgia    • Fibromyalgia syndrome    • GERD (gastroesophageal reflux disease)    • Hernia, hiatal    • Hyperlipidemia    • Hypertension     Goal 140/90   • IBS (irritable bowel syndrome)    • Intercostal pain    • Pain in both lower extremities    • S/P angioplasty with stent    • Tobacco abuse counseling      Past Surgical History:   Procedure Laterality Date   • BREAST SURGERY      augmentation   • CARDIAC CATHETERIZATION  10/05/2015    Left Heart with stents   •  SECTION      x 3   • CHOLECYSTECTOMY     • CORONARY ANGIOPLASTY WITH STENT PLACEMENT  10/2015   • CYSTOSCOPY BLADDER HYDRODISTENSION     • DILATATION AND CURETTAGE     • ENDOSCOPY     • FULGURATION ENDOMETRIOSIS       Family History   Problem Relation Age of Onset   • Diabetes Mother    • Hypertension Mother    • Cancer Father    • Heart disease Father    • Hearing loss Father    • Hypertension Father      Social History     Tobacco Use   • Smoking status: Current Every Day Smoker     Packs/day: 1.50     Years: 20.00     Pack years: 30.00     Types: Cigarettes     Start date:    • Smokeless tobacco: Never Used   Substance Use Topics   • Alcohol use: No   • Drug use: No     (Not in a hospital admission)    Allergies:  Plavix [clopidogrel bisulfate]    Objective      Vital Signs  /62   Ht 153 cm (60.25\")   Wt 30.8 kg (67 lb 12.8 oz)   BMI 13.13 kg/m² "     Physical Exam  Constitutional:       Appearance: Normal appearance. She is well-developed.   HENT:      Head: Normocephalic.   Eyes:      General: Lids are normal.      Extraocular Movements: EOM normal.      Conjunctiva/sclera: Conjunctivae normal.      Pupils: Pupils are equal, round, and reactive to light.   Cardiovascular:      Rate and Rhythm: Normal rate and regular rhythm.   Pulmonary:      Effort: Pulmonary effort is normal.      Breath sounds: Normal breath sounds.   Musculoskeletal:         General: Normal range of motion.      Cervical back: Normal range of motion.      Comments: Thoracic back pain   Skin:     General: Skin is warm.   Neurological:      Mental Status: She is alert and oriented to person, place, and time.      GCS: GCS eye subscore is 4. GCS verbal subscore is 5. GCS motor subscore is 6.      Cranial Nerves: No cranial nerve deficit.      Sensory: No sensory deficit.      Gait: Gait is intact. Gait normal.      Deep Tendon Reflexes: Strength normal and reflexes are normal and symmetric. Reflexes normal.   Psychiatric:         Speech: Speech normal.         Behavior: Behavior normal.         Thought Content: Thought content normal.         Neurologic Exam     Mental Status   Oriented to person, place, and time.   Attention: normal. Concentration: normal.   Speech: speech is normal   Level of consciousness: alert  Normal comprehension.     Cranial Nerves     CN II   Visual fields full to confrontation.     CN III, IV, VI   Pupils are equal, round, and reactive to light.  Extraocular motions are normal.     CN V   Facial sensation intact.     CN VII   Facial expression full, symmetric.     CN VIII   CN VIII normal.     CN IX, X   CN IX normal.   CN X normal.     CN XI   CN XI normal.     CN XII   CN XII normal.     Motor Exam   Muscle bulk: normal    Strength   Strength 5/5 throughout.     Sensory Exam   Light touch normal.     Gait, Coordination, and Reflexes     Gait  Gait:  normal    Reflexes   Reflexes 2+ except as noted.       Imaging review: CT scan of the thoracic spine that was done on September 2, 2021 at [facility] shows the patient does have a compression deformity of T8.  According to the radiology report this was new when compared to CT scan done in March 2021        Assessment/Plan: I did discuss this patient with Dr. Retana.  The patient is set up to have a PET scan next week.  We will get these results to see if there is any tumor involvement of the T8 vertebrae and if there is tumor involvement we can discuss doing an RFA treatment with a kyphoplasty and biopsy versus just a kyphoplasty and biopsy.  We will see her back in the office following the PET scan and make further recommendations at that time.  Patient is a smoker. Smoking cessation classes given to the patient  The patient's Body mass index is 13.13 kg/m².. BMI is below normal parameters. Recommendations include: Education    Diagnoses and all orders for this visit:    1. Compression fracture of T8 vertebra, initial encounter ([facility]) (Primary)    2. Patient underweight    3. Smoker          I discussed the patients findings and my recommendations with patient    Cesar Altamirano, APRN  10/14/21  12:45 CDT

## 2021-10-25 ENCOUNTER — TELEPHONE (OUTPATIENT)
Dept: NEUROSURGERY | Facility: CLINIC | Age: 56
End: 2021-10-25

## 2021-10-28 ENCOUNTER — PREP FOR SURGERY (OUTPATIENT)
Dept: OTHER | Facility: HOSPITAL | Age: 56
End: 2021-10-28

## 2021-10-28 ENCOUNTER — OFFICE VISIT (OUTPATIENT)
Dept: NEUROSURGERY | Facility: CLINIC | Age: 56
End: 2021-10-28

## 2021-10-28 VITALS
DIASTOLIC BLOOD PRESSURE: 88 MMHG | SYSTOLIC BLOOD PRESSURE: 158 MMHG | WEIGHT: 67 LBS | HEIGHT: 60 IN | BODY MASS INDEX: 13.15 KG/M2

## 2021-10-28 DIAGNOSIS — S22.060G COMPRESSION FRACTURE OF T8 VERTEBRA WITH DELAYED HEALING: Primary | ICD-10-CM

## 2021-10-28 DIAGNOSIS — C79.51 BONE METASTASIS: ICD-10-CM

## 2021-10-28 DIAGNOSIS — R63.6 PATIENT UNDERWEIGHT: ICD-10-CM

## 2021-10-28 DIAGNOSIS — F17.200 SMOKER: ICD-10-CM

## 2021-10-28 PROCEDURE — 99214 OFFICE O/P EST MOD 30 MIN: CPT | Performed by: NURSE PRACTITIONER

## 2021-10-28 RX ORDER — BUPIVACAINE HCL/0.9 % NACL/PF 0.1 %
2 PLASTIC BAG, INJECTION (ML) EPIDURAL ONCE
Status: CANCELLED | OUTPATIENT
Start: 2021-10-28 | End: 2021-10-28

## 2021-10-28 NOTE — PATIENT INSTRUCTIONS
High-Protein and High-Calorie Diet  Eating high-protein and high-calorie foods can help you to gain weight, heal after an injury, and recover after an illness or surgery. The specific amount of daily protein and calories you need depends on:  · Your body weight.  · The reason this diet is recommended for you.  What is my plan?  Generally, a high-protein, high-calorie diet involves:  · Eating 250-500 extra calories each day.  · Making sure that you get enough of your daily calories from protein. Ask your health care provider how many of your calories should come from protein.  Talk with a health care provider, such as a diet and nutrition specialist (dietitian), about how much protein and how many calories you need each day. Follow the diet as directed by your health care provider.  What are tips for following this plan?    Preparing meals  · Add whole milk, half-and-half, or heavy cream to cereal, pudding, soup, or hot cocoa.  · Add whole milk to instant breakfast drinks.  · Add peanut butter to oatmeal or smoothies.  · Add powdered milk to baked goods, smoothies, or milkshakes.  · Add powdered milk, cream, or butter to mashed potatoes.  · Add cheese to cooked vegetables.  · Make whole-milk yogurt parfaits. Top them with granola, fruit, or nuts.  · Add cottage cheese to your fruit.  · Add avocado, cheese, or both to sandwiches or salads.  · Add meat, poultry, or seafood to rice, pasta, casseroles, salads, and soups.  · Use mayonnaise when making egg salad, chicken salad, or tuna salad.  · Use peanut butter as a dip for vegetables or as a topping for pretzels, celery, or crackers.  · Add beans to casseroles, dips, and spreads.  · Add pureed beans to sauces and soups.  · Replace calorie-free drinks with calorie-containing drinks, such as milk and fruit juice.  · Replace water with milk or heavy cream when making foods such as oatmeal, pudding, or cocoa.  General instructions  · Ask your health care provider if you  should take a nutritional supplement.  · Try to eat six small meals each day instead of three large meals.  · Eat a balanced diet. In each meal, include one food that is high in protein.  · Keep nutritious snacks available, such as nuts, trail mixes, dried fruit, and yogurt.  · If you have kidney disease or diabetes, talk with your health care provider about how much protein is safe for you. Too much protein may put extra stress on your kidneys.  · Drink your calories. Choose high-calorie drinks and have them after your meals.  What high-protein foods should I eat?    Vegetables  Soybeans. Peas.  Grains  Quinoa. Bulgur wheat.  Meats and other proteins  Beef, pork, and poultry. Fish and seafood. Eggs. Tofu. Textured vegetable protein (TVP). Peanut butter. Nuts and seeds. Dried beans. Protein powders.  Dairy  Whole milk. Whole-milk yogurt. Powdered milk. Cheese. Cottage Cheese. Eggnog.  Beverages  High-protein supplement drinks. Soy milk.  Other foods  Protein bars.  The items listed above may not be a complete list of high-protein foods and beverages. Contact a dietitian for more options.  What high-calorie foods should I eat?  Fruits  Dried fruit. Fruit leather. Canned fruit in syrup. Fruit juice. Avocado.  Vegetables  Vegetables cooked in oil or butter. Fried potatoes.  Grains  Pasta. Quick breads. Muffins. Pancakes. Ready-to-eat cereal.  Meats and other proteins  Peanut butter. Nuts and seeds.  Dairy  Heavy cream. Whipped cream. Cream cheese. Sour cream. Ice cream. Custard. Pudding.  Beverages  Meal-replacement beverages. Nutrition shakes. Fruit juice. Sugar-sweetened soft drinks.  Seasonings and condiments  Salad dressing. Mayonnaise. Simba sauce. Fruit preserves or jelly. Honey. Syrup.  Sweets and desserts  Cake. Cookies. Pie. Pastries. Candy bars. Chocolate.  Fats and oils  Butter or margarine. Oil. Gravy.  Other foods  Meal-replacement bars.  The items listed above may not be a complete list of high-calorie  foods and beverages. Contact a dietitian for more options.  Summary  · A high-protein, high-calorie diet can help you gain weight or heal faster after an injury, illness, or surgery.  · To increase your protein and calories, add ingredients such as whole milk, peanut butter, cheese, beans, meat, or seafood to meal items.  · To get enough extra calories each day, include high-calorie foods and beverages at each meal.  · Adding a high-calorie drink or shake can be an easy way to help you get enough calories each day. Talk with your healthcare provider or dietitian about the best options for you.  This information is not intended to replace advice given to you by your health care provider. Make sure you discuss any questions you have with your health care provider.  Document Revised: 11/30/2018 Document Reviewed: 10/30/2018  Elsevier Patient Education © 2021 [employer]

## 2021-10-28 NOTE — PROGRESS NOTES
Chief complaint:   Chief Complaint   Patient presents with   • Back Pain     Rin returns today with Pet Scan films on CD from [facility] for review and follow up.        Subjective     HPI:This is a 56-year-old female patient who was referred to us by Dr. Josr Figueredo for thoracic back pain.  She is here to be evaluated today.  The patient was diagnosed with lung cancer 5 years ago and due to the unfortunate set of circumstances she did not seek treatment until a year ago.  She did have one round of chemotherapy and says that she did have a heart attack from the chemotherapy.  She has not had any more chemotherapy and has been doing radiation.  She says about 2 months ago she started having pain in her mid back around her bra line.  This pain is constant.  Is worse with activity.  Nothing makes it better.  She is not complain of any arm pain or lower extremity pain.  Denies any numbness or tingling.  Denies any bowel or bladder incontinence.  She has not done any recent physical therapy, chiropractic care, or pain management injections.  She does smoke 2 packs of cigarettes a day.  Denies any alcohol or illicit drug use.  She is right-hand dominant.  She is currently not working.  She is single.  Rates her pain on a scale 0-10 at nine.  The patient did go for a PET scan on October 26, 2021 is here in follow-up.    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    • Costochondritis    • Costochondritis, acute    • Deviated nasal septum     left   • Dyspnea on  "exertion    • Emphysema of lung (HCC)    • Endometriosis    • Fibromyalgia    • Fibromyalgia syndrome    • GERD (gastroesophageal reflux disease)    • Hernia, hiatal    • Hyperlipidemia    • Hypertension     Goal 140/90   • IBS (irritable bowel syndrome)    • Intercostal pain    • Pain in both lower extremities    • S/P angioplasty with stent    • Tobacco abuse counseling      Past Surgical History:   Procedure Laterality Date   • BREAST SURGERY      augmentation   • CARDIAC CATHETERIZATION  10/05/2015    Left Heart with stents   •  SECTION      x 3   • CHOLECYSTECTOMY     • CORONARY ANGIOPLASTY WITH STENT PLACEMENT  10/2015   • CYSTOSCOPY BLADDER HYDRODISTENSION     • DILATATION AND CURETTAGE     • ENDOSCOPY     • FULGURATION ENDOMETRIOSIS       Family History   Problem Relation Age of Onset   • Diabetes Mother    • Hypertension Mother    • Cancer Father    • Heart disease Father    • Hearing loss Father    • Hypertension Father      Social History     Tobacco Use   • Smoking status: Current Every Day Smoker     Packs/day: 1.50     Years: 20.00     Pack years: 30.00     Types: Cigarettes     Start date:    • Smokeless tobacco: Never Used   Substance Use Topics   • Alcohol use: No   • Drug use: No     (Not in a hospital admission)    Allergies:  Plavix [clopidogrel bisulfate]    Objective      Vital Signs  /88   Ht 152.4 cm (60\")   Wt 30.4 kg (67 lb)   BMI 13.09 kg/m²     Physical Exam    Neurologic Exam    Imaging review: PET scan was done at [facility] on 2021 shows the patient does have increased uptake at T8 concerning for metastatic lesions.    CT scan of the thoracic spine that was done on 2021 at [facility] shows the patient does have a compression deformity of T8.  According to the radiology report this was new when compared to CT scan done in 2021             Assessment/Plan: At this point it is felt that the patient would benefit by going to the " operating room for kyphoplasty with radiofrequency ablation.  Dr. Retana did review the imaging and determined to discuss this with the patient.  The patient is agreeable to this.  The risk and benefits of the procedure were gone over with the patient.  Her questions and concerns were addressed.  She was told to call us if any further problems or concerns.  We will get her cleared by primary care doctor.  She will also need to undergo Covid-19 testing if she does test positive this will delay her surgery.  She acknowledged understanding.  Patient is a smoker. Smoking cessation classes given to the patient  The patient's Body mass index is 13.09 kg/m².. BMI is below normal parameters. Recommendations include: Education material    Diagnoses and all orders for this visit:    1. Compression fracture of T8 vertebra with delayed healing (Primary)  -     Ambulatory Referral to Family Practice  -     Ambulatory Referral to Cardiology    2. Bone metastasis (HCC)  -     Ambulatory Referral to Family Practice  -     Ambulatory Referral to Cardiology    3. Smoker    4. Patient underweight          I discussed the patients findings and my recommendations with patient    Cesar Altamirano, APRN  10/28/21  13:55 CDT    Attending addendum: 56-year-old female with a pathologic metastatic compression fracture at T8.  She has back pain referable to this.  I would recommend a radiofrequency treatment to this level along with a kyphoplasty.  We would also biopsy of this level.  The risks and benefits were discussed at length which include were not limited to infection, bleeding, paralysis, spinal fluid leak, extravasation of kyphoplasty cement resulting in neurocompression, pulmonary embolism, stroke, coma, and death.  She acknowledged understanding this.  Her questions and concerns were addressed.

## 2021-10-29 ENCOUNTER — TELEPHONE (OUTPATIENT)
Dept: NEUROSURGERY | Facility: CLINIC | Age: 56
End: 2021-10-29

## 2021-10-29 NOTE — TELEPHONE ENCOUNTER
[address], [address]  [place] [address]  Phone: [phone]  Fax: [phone]                       Dear DR CHITRA JIMENEZ is scheduled for surgery on (DATE TO BE DETERMINED BASED ON CARDIAC AND MEDICAL CLEARANCE) with Dr RETANA.        Surgery name: KYPHOPLASTY WITH BIOPSY T8 with radiofrequency ablation and 2 c-arms    In order to do this procedure, we are requesting cardiac clearance as well as a request to hold anticoagulation medications.         Medication: ASA 81 MG    Amount of days medication needs to be held: TO BE DETERMINED BY PRESCRIBING PHYSICIAN                       Thank you,       Briana Gomez   Surgery Scheduler  [facility]   Aydin Retana MD/ Sabino Celis MD

## 2021-11-03 ENCOUNTER — TELEPHONE (OUTPATIENT)
Dept: NEUROSURGERY | Facility: CLINIC | Age: 56
End: 2021-11-03

## 2021-11-03 NOTE — TELEPHONE ENCOUNTER
ATTEMPTED TO CALL PT WITH SURGERY DATE AND TIME.     NO ANSWER, LEFT MESSAGE WITH DATES AND TIMES.     SEE CASE REQUEST FOR DETAILS.     ADVISED LETTER MAILED ALSO    IF PT RETURNS CALL OK TO TRANSFER TO [facility] [phone]

## 2021-11-09 ENCOUNTER — TELEPHONE (OUTPATIENT)
Dept: NEUROSURGERY | Facility: CLINIC | Age: 56
End: 2021-11-09

## 2021-11-09 NOTE — TELEPHONE ENCOUNTER
PT LEFT MESSAGE STATING THAT SHE HAS BEEN DIAGNOSED WITH BONE CANCER AND IS STARTING IMMUNOTHERAPY AND IS NEEDS TO KNOW WHAT TO DO ABOUT HER PREWORK AND HER SURGERY.     SPOKE TO DR THOMPSON PER DREA AND DR THOMPSON STATES THERE WILL NOT BE A PROBLEM FOR HER TO HAVE IMMUNOTHERAPY AND DO THE KYPHOPLASTY PROCEDURE.  IT IS ACTUALLY ENCOURAGED AS SHE HAS A FRACTURE AND THIS WILL PROMOTE HEALING AND HELP WITH HER PAIN.     ALSO THE BIOPSY HE DOES DURING THE PROCEDURE THEY WILL WANT THE RESULTS FROM.     LEFT MESSAGE FOR PATIENT WITH THIS INFORMATION AND ASKED HER TO CALL ME BACK.     I HAVE NOT CANCELLED HER PREWORK THAT IS SCHEDULED FOR TODAY 11/9/21, IF SHE NEEDS US TO WE WILL R/S THIS.

## 2021-11-10 NOTE — TELEPHONE ENCOUNTER
LEFT MESSAGE FOR  PATIENT REGARDING HER PRE OP TESTING APPT THAT WAS SCHEDULED FOR 11/9/21. PT MISSED APPT, WHICH WAS ANTICIPATED BASED ON THE MESSAGE LEFT ON 11/8.    ATTEMPTED TO CALL AND ENCOURAGE PATIENT TO R/S AND KEEP SURGERY ON SCHEDULE.

## 2021-11-11 ENCOUNTER — TELEPHONE (OUTPATIENT)
Dept: NEUROSURGERY | Facility: CLINIC | Age: 56
End: 2021-11-11

## 2021-11-11 NOTE — TELEPHONE ENCOUNTER
I HAVE ATTEMPTED TO CONTACT PATIENT REGARDING STATUS OF SURGERY AND R/S OF PREWORK TESTING AGAIN THIS AM. PT HAS NOT RETURNED PREVIOUS CALLS.     ATTEMPTED TO CONTACT PT EMERGENCY CONTACT JAIME DE AS WELL WITH NO ANSWER AND NO VOICE MAIL.     HER SISTER LICHA HAMMOND IS LISTED AS  A CONTACT IN REGISTRATION HOWEVER IS NOT ON VERBAL RELEASE SO DID NOT CALL HER.     PATIENT DID NOT ANSWER HER PHONE OR RETURN CALLS FOR ORIGINAL SURGERY SCHEDULING EITHER AND HAD TO BE MAILED LTR AS ONLY COMMUNICATION.

## 2021-11-12 ENCOUNTER — APPOINTMENT (OUTPATIENT)
Dept: PREADMISSION TESTING | Facility: HOSPITAL | Age: 56
End: 2021-11-12

## 2021-11-12 NOTE — TELEPHONE ENCOUNTER
PATIENT SURGERY AND PREWORK HAS BEEN CANCELLED. PT HAS BEEN LEFT ANOTHER VOICE MAIL OF THIS AS WELL.     IF PATIENT SHOULD RETURN CALL AND WANTS TO PROCEED WE WILL BE GLAD TO R/S

## 2021-11-17 ENCOUNTER — TELEPHONE (OUTPATIENT)
Dept: CARDIOLOGY | Facility: CLINIC | Age: 56
End: 2021-11-17

## 2021-11-17 NOTE — TELEPHONE ENCOUNTER
I agree with that plan.  Have her take increased dose of 60 as well as the losartan and metoprolol.  Have her keep a log as you mentioned and if it remains elevated last week we can adjust the medications.

## 2021-11-17 NOTE — TELEPHONE ENCOUNTER
Patient came to office today inquiring about her bp, which has gone back up. She states every time she goes to the doctor its 180/?. She was not able to provide me a log or give me an accurate reading. I advised her to keep a log and check at least once a day.  Patients cancer has spread to her bones and she states she is now in stage 4.    I read her our last note from 9-9-21 and advised her to take Imdur 60 mg. A new script was sent to pharmacy then but has not been filled at 60 mg, I called the pharmacy with patient in office and asked them to please fill the new prescription for 60 mg. Advised patient to take imdur 60 mg once daily, losartan 25 mg once daily, and metoprolol 25 mg bid, if needed can increase metoprolol 50 mg bid per note.    I told her I would double check with you to see if there were any other indications you need and if this information is still accurate.  Please advise.    Thank you.

## 2021-12-20 ENCOUNTER — TELEPHONE (OUTPATIENT)
Dept: NEUROSURGERY | Facility: CLINIC | Age: 56
End: 2021-12-20

## 2021-12-20 NOTE — TELEPHONE ENCOUNTER
CALLED PT - SHE DOES NOT WANT HER CD BACK FROM [facility] - WE HAVE DOWNLOADED INTO OUR SYSTEM NOW.

## 2021-12-29 ENCOUNTER — OFFICE VISIT (OUTPATIENT)
Dept: CARDIOLOGY | Facility: CLINIC | Age: 56
End: 2021-12-29

## 2021-12-29 VITALS
HEIGHT: 60 IN | OXYGEN SATURATION: 98 % | SYSTOLIC BLOOD PRESSURE: 126 MMHG | DIASTOLIC BLOOD PRESSURE: 84 MMHG | HEART RATE: 94 BPM | WEIGHT: 68 LBS | BODY MASS INDEX: 13.35 KG/M2

## 2021-12-29 DIAGNOSIS — E78.5 DYSLIPIDEMIA: ICD-10-CM

## 2021-12-29 DIAGNOSIS — I10 ESSENTIAL HYPERTENSION: ICD-10-CM

## 2021-12-29 DIAGNOSIS — R91.8 LUNG MASS: ICD-10-CM

## 2021-12-29 DIAGNOSIS — I25.10 CORONARY ARTERY DISEASE INVOLVING NATIVE CORONARY ARTERY OF NATIVE HEART WITHOUT ANGINA PECTORIS: Primary | ICD-10-CM

## 2021-12-29 DIAGNOSIS — Z72.0 TOBACCO ABUSE: ICD-10-CM

## 2021-12-29 DIAGNOSIS — C34.90 NON-SMALL CELL LUNG CANCER, UNSPECIFIED LATERALITY (HCC): ICD-10-CM

## 2021-12-29 PROCEDURE — 99214 OFFICE O/P EST MOD 30 MIN: CPT | Performed by: INTERNAL MEDICINE

## 2021-12-29 RX ORDER — POTASSIUM CHLORIDE 750 MG/1
10 TABLET, EXTENDED RELEASE ORAL DAILY
COMMUNITY
Start: 2021-12-22

## 2021-12-29 NOTE — PROGRESS NOTES
Reason for Visit: cardiovascular follow up.    HPI:  Rin Soler is a 56 y.o. female is here today for follow-up.  She feels like she is doing a little better recently.  She is currently undergoing immunotherapy for her lung cancer.  She reports that metastasis to her bones has been discovered.  Because of this she has developed a compression fracture of her spine.  She is considering surgery for this.  She has been stable from a cardiac standpoint and is tolerating all her medications.  She continues to smoke and is not ready to quit.      Previous Cardiac Testing and Procedures:  - Stress echo (08/04/2015) negative for ischemia  - Lipid panel (09/03/2015) 166/47/93/130  - LHC (10/05/2015) 90% mid RCA stenosis status post PCI with 2.25 x 12 mm Xience Alpine IAN, no other coronary artery disease, EF 55% with mild inferior hypokinesis  - Stress echo (03/29/2016) low risk for ischemia, good exercise capacity  - Echo (12/22/2016) EF 60-65%, normal RV size and function, normal diastolic function, all valves structurally and functionally normal  - VESTA (12/22/2016) normal arterial exam of the lower extremities  - Holter monitor (12/22/2016) sinus rhythm with rare atrial ventricular ectopic beats, no significant pauses, arrhythmias, or events, symptoms associated with sinus tachycardia  - Lipid panel (10/3/2017) total cholesterol 106, HDL 53, LDL 31, triglycerides 110  - Exercise nuclear stress (3/13/2019) negative for ischemia, EF 78%  - Lipid panel (3/5/2021) total cholesterol 85, HDL 45, LDL 25, triglycerides 73  - CT chest (4/9/2021) 4.9 x 2.9 x 5.8 uh right upper lobe lung mass abutting the medial pleura with direct extension into the right hilum and suspected mediastinal invasion with abutment of the IVC and aortic arch and encasement of the right upper lobe pulmonary arteries and abutment of the right upper lobe bronchus, no definite metastatic disease of the chest, severe emphysema  - Creatinine (4/12/2021)  0.7  - Echo (6/3/2021) EF 55-60%  - BMP (9/7/2021) creatinine 0.82, potassium 3.3, sodium 143-  - CBC (9/7/2021) WBC 5.5, hemoglobin 12.9, platelets 141    Patient Active Problem List   Diagnosis   • Dysphagia   • GERD (gastroesophageal reflux disease)   • Tobacco abuse   • Coronary artery disease involving native coronary artery of native heart without angina pectoris   • Dyslipidemia   • Essential hypertension   • COPD (chronic obstructive pulmonary disease) (HCC)   • Lung cancer (HCC)   • Lung mass   • Severe malnutrition (HCC)   • Postprocedural pneumothorax   • Chronic pain syndrome   • Chronic back pain   • Pressure injury of coccygeal region, stage 2 (HCC)   • Thrombocytopenia (HCC)   • Compression fracture of T8 vertebra with delayed healing   • Patient underweight   • Smoker   • Bone metastasis (HCC)       Social History     Tobacco Use   • Smoking status: Current Every Day Smoker     Packs/day: 1.50     Years: 20.00     Pack years: 30.00     Types: Cigarettes     Start date: 1976   • Smokeless tobacco: Never Used   Substance Use Topics   • Alcohol use: No   • Drug use: No       Family History   Problem Relation Age of Onset   • Diabetes Mother    • Hypertension Mother    • Cancer Father    • Heart disease Father    • Hearing loss Father    • Hypertension Father        The following portions of the patient's history were reviewed and updated as appropriate: allergies, current medications, past family history, past medical history, past social history, past surgical history and problem list.      Current Outpatient Medications:   •  aspirin 81 MG EC tablet, Take 81 mg by mouth Daily., Disp: , Rfl:   •  atorvastatin (LIPITOR) 20 MG tablet, Take 1 tablet by mouth Daily., Disp: 30 tablet, Rfl: 11  •  diazePAM (VALIUM) 10 MG tablet, Take 10 mg by mouth 3 (Three) Times a Day As Needed for Anxiety., Disp: , Rfl: 1  •  folic acid (FOLVITE) 1 MG tablet, Take 1 mg by mouth Daily., Disp: , Rfl:   •   "HYDROcodone-acetaminophen (NORCO)  MG per tablet, Take 1 tablet by mouth Every 4 (Four) Hours As Needed for Moderate Pain  or Severe Pain . Every 4 to 6 hours prn, Disp: , Rfl: 0  •  isosorbide mononitrate (IMDUR) 60 MG 24 hr tablet, Take 1 tablet by mouth Daily., Disp: 30 tablet, Rfl: 11  •  losartan (Cozaar) 25 MG tablet, Take 0.5 tablets by mouth Daily. (Patient taking differently: Take 25 mg by mouth Daily.), Disp: 15 tablet, Rfl: 11  •  metoprolol tartrate (LOPRESSOR) 25 MG tablet, Take 1 tablet by mouth 2 (Two) Times a Day., Disp: 60 tablet, Rfl: 11  •  pantoprazole (PROTONIX) 40 MG EC tablet, Take 40 mg by mouth Daily., Disp: , Rfl: 11  •  nitroglycerin (NITROSTAT) 0.4 MG SL tablet, Place 1 tablet under the tongue Every 5 (Five) Minutes As Needed for Chest Pain. Take no more than 3 doses in 15 minutes., Disp: 25 tablet, Rfl: 5  •  potassium chloride (K-DUR,KLOR-CON) 20 MEQ CR tablet, , Disp: , Rfl:     Review of Systems   Constitutional: Negative for chills and fever.   Cardiovascular: Positive for chest pain (rib pain). Negative for palpitations and paroxysmal nocturnal dyspnea.   Respiratory: Negative for cough and shortness of breath.    Skin: Negative for rash.   Musculoskeletal: Positive for back pain.   Gastrointestinal: Negative for abdominal pain and heartburn.   Neurological: Negative for dizziness and numbness.   Psychiatric/Behavioral: The patient is nervous/anxious.        Objective   /84 (BP Location: Left arm, Patient Position: Sitting, Cuff Size: Adult)   Pulse 94   Ht 152.4 cm (60\")   Wt 30.8 kg (68 lb)   SpO2 98%   BMI 13.28 kg/m²   Constitutional:       Appearance: Well-developed and underweight. Frail. Chronically ill-appearing.   HENT:      Head: Normocephalic and atraumatic.   Pulmonary:      Effort: Pulmonary effort is normal.      Breath sounds: Normal breath sounds.   Cardiovascular:      Normal rate. Regular rhythm.      Murmurs: There is no murmur.      No gallop. No " click.   Edema:     Peripheral edema absent.   Skin:     General: Skin is warm and dry.   Neurological:      Mental Status: Alert and oriented to person, place, and time.       Procedures      ICD-10-CM ICD-9-CM   1. Coronary artery disease involving native coronary artery of native heart without angina pectoris  I25.10 414.01   2. Tobacco abuse  Z72.0 305.1   3. Dyslipidemia  E78.5 272.4   4. Essential hypertension  I10 401.9   5. Lung mass  R91.8 786.6   6. Non-small cell lung cancer, unspecified laterality (HCC)  C34.90 162.9         Assessment/Plan:  1.  Coronary artery disease: History of PCI to RCA 10/2015.  She had a [facility]STEMI at [facility] on 7/[facility]/2021 that was managed medically due to her comorbidities.  She is currently stable and doing well on current medical therapy.  Her chest pain symptoms are consistent with musculoskeletal etiology and she does not have any significant angina.  Continue aspirin, atorvastatin, Imdur, metoprolol, and nitroglycerin.       2.  Tobacco abuse: Rin Soler  reports that she has been smoking cigarettes. She started smoking about 46 years ago. She has a 30.00 pack-year smoking history. She has never used smokeless tobacco.. I have educated her on the risk of diseases from using tobacco products such as cancer, COPD and heart disease.  I advised her to quit and she is not willing to quit.  I spent 4.5 minutes counseling the patient.      3.  Dyslipidemia: Continue atorvastatin.     4.  Essential hypertension: Blood pressures within normal limits today.  Continue losartan and metoprolol.     5.  Non-small cell lung cancer: Currently managed on immunotherapy.     6.  Preoperative evaluation: Patient is an acceptable risk candidate from a cardiac standpoint.  She is currently stable on good medical therapy.  Recommend continue current medications including aspirin throughout the perioperative period.

## 2022-05-12 ENCOUNTER — OFFICE VISIT (OUTPATIENT)
Dept: CARDIOLOGY | Facility: CLINIC | Age: 57
End: 2022-05-12

## 2022-05-12 VITALS
HEIGHT: 58 IN | OXYGEN SATURATION: 100 % | WEIGHT: 67.6 LBS | BODY MASS INDEX: 14.19 KG/M2 | SYSTOLIC BLOOD PRESSURE: 158 MMHG | HEART RATE: 87 BPM | DIASTOLIC BLOOD PRESSURE: 102 MMHG

## 2022-05-12 DIAGNOSIS — R63.6 PATIENT UNDERWEIGHT: ICD-10-CM

## 2022-05-12 DIAGNOSIS — I10 ESSENTIAL HYPERTENSION: ICD-10-CM

## 2022-05-12 DIAGNOSIS — Z72.0 TOBACCO ABUSE: ICD-10-CM

## 2022-05-12 DIAGNOSIS — I25.10 CORONARY ARTERY DISEASE INVOLVING NATIVE CORONARY ARTERY OF NATIVE HEART WITHOUT ANGINA PECTORIS: Primary | ICD-10-CM

## 2022-05-12 DIAGNOSIS — E78.5 DYSLIPIDEMIA: ICD-10-CM

## 2022-05-12 PROCEDURE — 93000 ELECTROCARDIOGRAM COMPLETE: CPT | Performed by: INTERNAL MEDICINE

## 2022-05-12 PROCEDURE — 99214 OFFICE O/P EST MOD 30 MIN: CPT | Performed by: INTERNAL MEDICINE

## 2022-05-12 RX ORDER — LOSARTAN POTASSIUM 50 MG/1
50 TABLET ORAL DAILY
Qty: 30 TABLET | Refills: 11 | Status: SHIPPED | OUTPATIENT
Start: 2022-05-12

## 2022-05-12 NOTE — PROGRESS NOTES
Reason for Visit: cardiovascular follow up.    HPI:  Rin Soler is a 57 y.o. female is here today for follow-up.  She has not had any recent issues with her heart.  Her blood pressure has been running high.  The cancer treatments are going well and the tumor is shrinking.  She has been stressed about a lot of issues and this has made it difficult to quit smoking.      Previous Cardiac Testing and Procedures:  - Stress echo (08/04/2015) negative for ischemia  - Lipid panel (09/03/2015) 166/47/93/130  - LHC (10/05/2015) 90% mid RCA stenosis status post PCI with 2.25 x 12 mm Xience Alpine IAN, no other coronary artery disease, EF 55% with mild inferior hypokinesis  - Stress echo (03/29/2016) low risk for ischemia, good exercise capacity  - Echo (12/22/2016) EF 60-65%, normal RV size and function, normal diastolic function, all valves structurally and functionally normal  - VESTA (12/22/2016) normal arterial exam of the lower extremities  - Holter monitor (12/22/2016) sinus rhythm with rare atrial ventricular ectopic beats, no significant pauses, arrhythmias, or events, symptoms associated with sinus tachycardia  - Lipid panel (10/3/2017) total cholesterol 106, HDL 53, LDL 31, triglycerides 110  - Exercise nuclear stress (3/13/2019) negative for ischemia, EF 78%  - Lipid panel (3/5/2021) total cholesterol 85, HDL 45, LDL 25, triglycerides 73  - CT chest (4/9/2021) 4.9 x 2.9 x 5.8 uh right upper lobe lung mass abutting the medial pleura with direct extension into the right hilum and suspected mediastinal invasion with abutment of the IVC and aortic arch and encasement of the right upper lobe pulmonary arteries and abutment of the right upper lobe bronchus, no definite metastatic disease of the chest, severe emphysema  - Creatinine (4/12/2021) 0.7  - Echo (6/3/2021) EF 55-60%  - BMP (9/7/2021) creatinine 0.82, potassium 3.3, sodium 143-  - CBC (9/7/2021) WBC 5.5, hemoglobin 12.9, platelets 141    Patient Active  Problem List   Diagnosis   • Dysphagia   • GERD (gastroesophageal reflux disease)   • Tobacco abuse   • Coronary artery disease involving native coronary artery of native heart without angina pectoris   • Dyslipidemia   • Essential hypertension   • COPD (chronic obstructive pulmonary disease) (HCC)   • Lung cancer (HCC)   • Lung mass   • Severe malnutrition (HCC)   • Postprocedural pneumothorax   • Chronic pain syndrome   • Chronic back pain   • Pressure injury of coccygeal region, stage 2 (HCC)   • Thrombocytopenia (HCC)   • Compression fracture of T8 vertebra with delayed healing   • Patient underweight   • Smoker   • Bone metastasis (HCC)       Social History     Tobacco Use   • Smoking status: Current Every Day Smoker     Packs/day: 1.50     Years: 20.00     Pack years: 30.00     Types: Cigarettes     Start date: 1976   • Smokeless tobacco: Never Used   Substance Use Topics   • Alcohol use: No   • Drug use: No       Family History   Problem Relation Age of Onset   • Diabetes Mother    • Hypertension Mother    • Cancer Father    • Heart disease Father    • Hearing loss Father    • Hypertension Father        The following portions of the patient's history were reviewed and updated as appropriate: allergies, current medications, past family history, past medical history, past social history, past surgical history and problem list.      Current Outpatient Medications:   •  aspirin 81 MG EC tablet, Take 81 mg by mouth Daily., Disp: , Rfl:   •  atorvastatin (LIPITOR) 20 MG tablet, Take 1 tablet by mouth Daily., Disp: 30 tablet, Rfl: 11  •  diazePAM (VALIUM) 10 MG tablet, Take 10 mg by mouth 3 (Three) Times a Day As Needed for Anxiety., Disp: , Rfl: 1  •  folic acid (FOLVITE) 1 MG tablet, Take 1 mg by mouth Daily., Disp: , Rfl:   •  HYDROcodone-acetaminophen (NORCO)  MG per tablet, Take 1 tablet by mouth Every 4 (Four) Hours As Needed for Moderate Pain  or Severe Pain . Every 4 to 6 hours prn, Disp: , Rfl: 0  •   "isosorbide mononitrate (IMDUR) 60 MG 24 hr tablet, Take 1 tablet by mouth Daily., Disp: 30 tablet, Rfl: 11  •  losartan (Cozaar) 50 MG tablet, Take 1 tablet by mouth Daily., Disp: 30 tablet, Rfl: 11  •  metoprolol tartrate (LOPRESSOR) 25 MG tablet, Take 1 tablet by mouth 2 (Two) Times a Day., Disp: 60 tablet, Rfl: 11  •  nitroglycerin (NITROSTAT) 0.4 MG SL tablet, Place 1 tablet under the tongue Every 5 (Five) Minutes As Needed for Chest Pain. Take no more than 3 doses in 15 minutes., Disp: 25 tablet, Rfl: 5  •  pantoprazole (PROTONIX) 40 MG EC tablet, Take 40 mg by mouth Daily., Disp: , Rfl: 11  •  potassium chloride (K-DUR,KLOR-CON) 20 MEQ CR tablet, , Disp: , Rfl:     Review of Systems   Constitutional: Positive for malaise/fatigue. Negative for chills and fever.   Cardiovascular: Negative for chest pain and paroxysmal nocturnal dyspnea.   Respiratory: Positive for shortness of breath. Negative for cough.    Skin: Negative for rash.   Gastrointestinal: Negative for abdominal pain and heartburn.   Neurological: Negative for dizziness and numbness.   Psychiatric/Behavioral: The patient is nervous/anxious.        Objective   BP (!) 158/102 (BP Location: Left arm, Patient Position: Sitting, Cuff Size: Adult)   Pulse 87   Ht 147.3 cm (58\")   Wt 30.7 kg (67 lb 9.6 oz)   SpO2 100%   BMI 14.13 kg/m²   Constitutional:       Appearance: Underweight. Frail. Chronically ill-appearing.   HENT:      Head: Normocephalic and atraumatic.   Pulmonary:      Effort: Pulmonary effort is normal.      Breath sounds: Normal breath sounds.   Cardiovascular:      Normal rate. Regular rhythm.      Murmurs: There is no murmur.      No gallop. No click.   Edema:     Peripheral edema absent.   Skin:     General: Skin is warm and dry.   Neurological:      Mental Status: Alert and oriented to person, place, and time.   Psychiatric:         Mood and Affect: Mood is anxious.         ECG 12 Lead    Date/Time: 5/12/2022 3:01 PM  Performed by: " Mike Cunningham MD  Authorized by: Mike Cunningham MD   Comparison: compared with previous ECG from 7/26/2021  Comparison to previous ECG: T wave versions have improved  Rhythm: sinus rhythm  Rate: normal  Q waves: V1 and V2                  ICD-10-CM ICD-9-CM   1. Coronary artery disease involving native coronary artery of native heart without angina pectoris  I25.10 414.01   2. Tobacco abuse  Z72.0 305.1   3. Dyslipidemia  E78.5 272.4   4. Essential hypertension  I10 401.9   5. Patient underweight  R63.6 783.22         Assessment/Plan:  1. Coronary artery disease: History of PCI to RCA 10/2015.  NSTEMI at [facility] on 7/20/2021 that was managed medically due to her comorbidities.  She is currently chest pain-free.  Continue aspirin, atorvastatin, Imdur, metoprolol, and nitroglycerin.       2.  Tobacco abuse: Rin Soler  reports that she has been smoking cigarettes. She started smoking about 46 years ago. She has a 30.00 pack-year smoking history. She has never used smokeless tobacco.. I have educated her on the risk of diseases from using tobacco products such as cancer, COPD and heart disease.  I advised her to quit and she is not willing to quit.  I spent 5 minutes counseling the patient.      3.  Dyslipidemia: Lipid panel from 3/5/2021 showed excellent control. Continue atorvastatin.     4.  Essential hypertension: Blood pressures is elevated today and at home.  Titrate up losartan to 50 mg.  Continue metoprolol.      5.  Underweight: Body mass index is 14.13 kg/m².

## 2022-06-14 ENCOUNTER — TELEPHONE (OUTPATIENT)
Dept: CARDIOLOGY | Facility: CLINIC | Age: 57
End: 2022-06-14

## 2022-06-14 RX ORDER — LOSARTAN POTASSIUM 25 MG/1
25 TABLET ORAL DAILY
Qty: 30 TABLET | Refills: 11 | OUTPATIENT
Start: 2022-06-14

## 2022-06-14 NOTE — TELEPHONE ENCOUNTER
Caller: Rin Soler    Relationship: Self    Best call back number: [phone]    What is the best time to reach you: ANY    Who are you requesting to speak with (clinical staff, provider,  specific staff member): ANY      PT ADVISES THAT PHARMACY IS REQUESTING APPROVAL FROM DR BUENROSTRO IN REGARDS TO THE MG DOSAGE ON LOSARTAN AND THAT THE PRESCRIPTION IS ON HOLD (WAS 25 MG, CHANGED TO 50MG)

## 2022-06-15 NOTE — TELEPHONE ENCOUNTER
Call returned to patient. Med dose adjustments were clarify with patient and [facility] pharmacy in [place].

## 2022-08-03 NOTE — TELEPHONE ENCOUNTER
[facility] EMERGENCY DEPT  EMERGENCY DEPARTMENT ENCOUNTER      Pt Name: Philomena Roberson  MRN: [id number]  [place] 2021  Date of evaluation: 8/3/2022  Provider: CALI Montalvo CNP    CHIEF COMPLAINT       Chief Complaint   Patient presents with    Other     Pt not eating or drinking much the last few days; tested for RSV at urgent care today           HISTORY OF PRESENT ILLNESS   (Location/Symptom, Timing/Onset, Context/Setting, Quality, Duration, Modifying Factors, Severity)  Note limiting factors. Philomena Roberson is a 8 m.o. female who presents to the emergency department accompanied by mom with concern for illness x2 days. She has been taking less liquid by mouth. She was seen at urgent care today and tested negative for RSV. Mom tells me she was told ears were infected and has had first dose of amoxicillin. No tylenol or ibuprofen yet today. [facility]    Nursing Notes were reviewed. REVIEW OF SYSTEMS    (2-9 systems for level 4, 10 or more for level 5)     Review of Systems   Constitutional:  Positive for fever. Negative for crying. HENT:  Positive for congestion and rhinorrhea. Respiratory:  Positive for cough. Negative for wheezing and stridor. Gastrointestinal:  Negative for diarrhea and vomiting. Skin:  Negative for rash. Except as noted above the remainder of the review of systems was reviewed and negative. PAST MEDICAL HISTORY   No past medical history on file. SURGICAL HISTORY     No past surgical history on file. CURRENT MEDICATIONS       Previous Medications    AMOXICILLIN (AMOXIL) 400 MG/5ML SUSPENSION    Take 3.4 mLs by mouth in the morning and 3.4 mLs before bedtime. Do all this for 10 days. ALLERGIES     Patient has no known allergies. FAMILY HISTORY     No family history on file.        SOCIAL HISTORY       Social History     Socioeconomic History    Marital status: Single       SCREENINGS          [place] Coma Scale (Less than 1 year)  Eye Opening: Patient has been given an appointment with [facility] for 10/14/2021.    Spontaneous  Best Auditory/Visual Stimuli Response: [place] and babbles  Best Motor Response: Moves spontaneously and purposefully  [place] Coma Scale Score: 15                    CIWA Assessment  Heart Rate: 158                 PHYSICAL EXAM    (up to 7 for level 4, 8 or more for level 5)     ED Triage Vitals [08/03/22 1240]   BP Temp Temp src Heart Rate Resp SpO2 Height Weight   -- 98.8 °F (37.1 °C) -- 158 22 99 % -- --       Physical Exam  Vitals reviewed. Constitutional:       General: She is active. She is not in acute distress. Appearance: Normal appearance. She is well-developed. She is not toxic-appearing. HENT:      Head: Normocephalic. Anterior fontanelle is flat. Right Ear: Tympanic membrane, ear canal and external ear normal.      Left Ear: Tympanic membrane, ear canal and external ear normal.      Nose: Congestion present. Mouth/Throat:      Mouth: Mucous membranes are moist.      Pharynx: Oropharynx is clear. Eyes:      General: Red reflex is present bilaterally. Extraocular Movements: Extraocular movements intact. Conjunctiva/sclera: Conjunctivae normal.      Pupils: Pupils are equal, round, and reactive to light. Cardiovascular:      Rate and Rhythm: Normal rate and regular rhythm. Pulses: Normal pulses. Heart sounds: Normal heart sounds. Pulmonary:      Effort: Pulmonary effort is normal.      Breath sounds: Normal breath sounds. Abdominal:      General: Bowel sounds are normal. There is no distension. Palpations: Abdomen is soft. Tenderness: There is no abdominal tenderness. There is no guarding or rebound. Musculoskeletal:         General: Normal range of motion. Cervical back: Neck supple. Lymphadenopathy:      Cervical: No cervical adenopathy. Skin:     General: Skin is warm and dry. Capillary Refill: Capillary refill takes less than 2 seconds. Turgor: Normal.   Neurological:      General: No focal deficit present. Mental Status: She is alert. DIAGNOSTIC RESULTS     EKG: All EKG's are interpreted by the Emergency Department Physician who either signs or Co-signs this chart in the absence of a cardiologist.        RADIOLOGY:   Non-plain film images such as CT, Ultrasound and MRI are read by the radiologist. Plain radiographic images are visualized and preliminarily interpreted by the emergency physician with the below findings:        Interpretation per the Radiologist below, if available at the time of this note:    XR CHEST PORTABLE   Final Result   Mild increased markings and peribronchial cuffing in the perihilar regions, supportive of reactive airways and/or viral pneumonitis. Recommendation: Follow up as clinically indicated. Electronically Signed by Becky Jasso MD at 03-Aug-2022 03:27:34 PM                     ED BEDSIDE ULTRASOUND:   Performed by ED Physician - none    LABS:  Labs Reviewed   RESPIRATORY PANEL, MOLECULAR, WITH COVID-19 - Abnormal; Notable for the following components:       Result Value    Parainfluenza Virus 4 by PCR DETECTED (*)     All other components within normal limits       All other labs were within normal range or not returned as of this dictation. EMERGENCY DEPARTMENT COURSE and DIFFERENTIAL DIAGNOSIS/MDM:   Vitals:    Vitals:    08/03/22 1240   Pulse: 158   Resp: 22   Temp: 98.8 °F (37.1 °C)   SpO2: 99%           MDM        REASSESSMENT     ED Course as of 08/03/22 1458   Wed Aug 03, 2022   1439 Appears to be feeling much better after ibuprofen. Crawling on mom, tolerating PO. Counseled to treatment, follow up and return parameters. [BW]      ED Course User Index  [BW] Bianca Andre, APRN - CNP         CRITICAL CARE TIME       CONSULTS:  None    PROCEDURES:  Unless otherwise noted below, none     Procedures         FINAL IMPRESSION      1.  Viral syndrome          DISPOSITION/PLAN   DISPOSITION Decision To Discharge 08/03/2022 02:57:39 PM      PATIENT REFERRED TO:  [address] [place] [id number] [phone]  Call in 1 day      DISCHARGE MEDICATIONS:  New Prescriptions    No medications on file     Controlled Substances Monitoring:     No flowsheet data found.     (Please note that portions of this note were completed with a voice recognition program.  Efforts were made to edit the dictations but occasionally words are mis-transcribed.)    CALI Villeda CNP (electronically signed)  Attending Emergency Physician          CALI Villeda CNP  08/03/22 4914

## 2022-09-08 ENCOUNTER — OFFICE VISIT (OUTPATIENT)
Dept: CARDIOLOGY | Facility: CLINIC | Age: 57
End: 2022-09-08

## 2022-09-08 VITALS
WEIGHT: 67 LBS | SYSTOLIC BLOOD PRESSURE: 136 MMHG | HEART RATE: 90 BPM | HEIGHT: 58 IN | DIASTOLIC BLOOD PRESSURE: 84 MMHG | BODY MASS INDEX: 14.06 KG/M2 | OXYGEN SATURATION: 98 %

## 2022-09-08 DIAGNOSIS — R63.6 PATIENT UNDERWEIGHT: ICD-10-CM

## 2022-09-08 DIAGNOSIS — Z72.0 TOBACCO ABUSE: ICD-10-CM

## 2022-09-08 DIAGNOSIS — I10 ESSENTIAL HYPERTENSION: ICD-10-CM

## 2022-09-08 DIAGNOSIS — I25.10 CORONARY ARTERY DISEASE INVOLVING NATIVE CORONARY ARTERY OF NATIVE HEART WITHOUT ANGINA PECTORIS: Primary | ICD-10-CM

## 2022-09-08 DIAGNOSIS — E78.5 DYSLIPIDEMIA: ICD-10-CM

## 2022-09-08 PROCEDURE — 99214 OFFICE O/P EST MOD 30 MIN: CPT | Performed by: INTERNAL MEDICINE

## 2022-09-08 RX ORDER — PREDNISONE 20 MG/1
TABLET ORAL
COMMUNITY

## 2022-09-08 RX ORDER — AZITHROMYCIN 250 MG/1
TABLET, FILM COATED ORAL
COMMUNITY

## 2022-09-08 NOTE — PROGRESS NOTES
Reason for Visit: cardiovascular follow up.    HPI:  Rin Soler is a 57 y.o. female is here today for follow-up.  She recently had a PET scan done and reports no current evidence of cancer.  She was told it is not curable and will require lifelong treatment.  She had to miss a treatment due to a cold.  She has been dong relatively well from a cardiac standpoint.  She notes some occasional pain in her left lower rib and upper abdomen.  She does not have any current pain going across her chest.  Her blood pressure has been better controlled at home.  She is still unable to quit smoking.      Previous Cardiac Testing and Procedures:  - Stress echo (08/04/2015) negative for ischemia  - Lipid panel (09/03/2015) 166/47/93/130  - LHC (10/05/2015) 90% mid RCA stenosis status post PCI with 2.25 x 12 mm Xience Alpine IAN, no other coronary artery disease, EF 55% with mild inferior hypokinesis  - Stress echo (03/29/2016) low risk for ischemia, good exercise capacity  - Echo (12/22/2016) EF 60-65%, normal RV size and function, normal diastolic function, all valves structurally and functionally normal  - VESTA (12/22/2016) normal arterial exam of the lower extremities  - Holter monitor (12/22/2016) sinus rhythm with rare atrial ventricular ectopic beats, no significant pauses, arrhythmias, or events, symptoms associated with sinus tachycardia  - Lipid panel (10/3/2017) total cholesterol 106, HDL 53, LDL 31, triglycerides 110  - Exercise nuclear stress (3/13/2019) negative for ischemia, EF 78%  - Lipid panel (3/5/2021) total cholesterol 85, HDL 45, LDL 25, triglycerides 73  - CT chest (4/9/2021) 4.9 x 2.9 x 5.8 uh right upper lobe lung mass abutting the medial pleura with direct extension into the right hilum and suspected mediastinal invasion with abutment of the IVC and aortic arch and encasement of the right upper lobe pulmonary arteries and abutment of the right upper lobe bronchus, no definite metastatic disease of the  chest, severe emphysema  - Creatinine (4/12/2021) 0.7  - Echo (6/3/2021) EF 55-60%  - BMP (9/7/2021) creatinine 0.82, potassium 3.3, sodium 143-  - CBC (9/7/2021) WBC 5.5, hemoglobin 12.9, platelets 141    Patient Active Problem List   Diagnosis   • Dysphagia   • GERD (gastroesophageal reflux disease)   • Tobacco abuse   • Coronary artery disease involving native coronary artery of native heart without angina pectoris   • Dyslipidemia   • Essential hypertension   • COPD (chronic obstructive pulmonary disease) (HCC)   • Lung cancer (HCC)   • Lung mass   • Severe malnutrition (HCC)   • Postprocedural pneumothorax   • Chronic pain syndrome   • Chronic back pain   • Pressure injury of coccygeal region, stage 2 (HCC)   • Thrombocytopenia (HCC)   • Compression fracture of T8 vertebra with delayed healing   • Patient underweight   • Smoker   • Bone metastasis (HCC)       Social History     Tobacco Use   • Smoking status: Current Every Day Smoker     Packs/day: 1.50     Years: 20.00     Pack years: 30.00     Types: Cigarettes     Start date: 1976   • Smokeless tobacco: Never Used   Vaping Use   • Vaping Use: Never used   Substance Use Topics   • Alcohol use: No   • Drug use: No       Family History   Problem Relation Age of Onset   • Diabetes Mother    • Hypertension Mother    • Cancer Father    • Heart disease Father    • Hearing loss Father    • Hypertension Father        The following portions of the patient's history were reviewed and updated as appropriate: allergies, current medications, past family history, past medical history, past social history, past surgical history and problem list.      Current Outpatient Medications:   •  aspirin 81 MG EC tablet, Take 81 mg by mouth Daily., Disp: , Rfl:   •  atorvastatin (LIPITOR) 20 MG tablet, Take 1 tablet by mouth Daily., Disp: 30 tablet, Rfl: 11  •  diazePAM (VALIUM) 10 MG tablet, Take 10 mg by mouth 3 (Three) Times a Day As Needed for Anxiety., Disp: , Rfl: 1  •  folic  "acid (FOLVITE) 1 MG tablet, Take 1 mg by mouth Daily., Disp: , Rfl:   •  HYDROcodone-acetaminophen (NORCO)  MG per tablet, Take 1 tablet by mouth Every 4 (Four) Hours As Needed for Moderate Pain  or Severe Pain . Every 4 to 6 hours prn, Disp: , Rfl: 0  •  isosorbide mononitrate (IMDUR) 60 MG 24 hr tablet, Take 1 tablet by mouth Daily., Disp: 30 tablet, Rfl: 11  •  losartan (Cozaar) 50 MG tablet, Take 1 tablet by mouth Daily., Disp: 30 tablet, Rfl: 11  •  metoprolol tartrate (LOPRESSOR) 25 MG tablet, Take 1 tablet by mouth 2 (Two) Times a Day., Disp: 60 tablet, Rfl: 11  •  nitroglycerin (NITROSTAT) 0.4 MG SL tablet, Place 1 tablet under the tongue Every 5 (Five) Minutes As Needed for Chest Pain. Take no more than 3 doses in 15 minutes., Disp: 25 tablet, Rfl: 5  •  pantoprazole (PROTONIX) 40 MG EC tablet, Take 40 mg by mouth Daily., Disp: , Rfl: 11  •  potassium chloride (K-DUR,KLOR-CON) 10 MEQ CR tablet, Take 10 mEq by mouth Daily., Disp: , Rfl:   •  azithromycin (ZITHROMAX) 250 MG tablet, Zithromax Z-Donny 250 mg tablet  TAKE 2 TABLETS (500 MG) BY ORAL ROUTE ONCE DAILY FOR 1 DAY THEN 1 TABLET (250 MG) BY ORAL ROUTE ONCE DAILY FOR 4 DAYS, Disp: , Rfl:   •  predniSONE (DELTASONE) 20 MG tablet, prednisone 20 mg tablet  Take 1 tablet every day by oral route., Disp: , Rfl:     Review of Systems   Constitutional: Negative for chills and fever.   Cardiovascular: Negative for chest pain and paroxysmal nocturnal dyspnea.   Respiratory: Negative for cough and shortness of breath.    Skin: Negative for rash.   Gastrointestinal: Positive for abdominal pain. Negative for heartburn.   Neurological: Negative for dizziness and numbness.       Objective   /84 (BP Location: Left arm, Patient Position: Sitting, Cuff Size: Adult)   Pulse 90   Ht 147.3 cm (57.99\")   Wt 30.4 kg (67 lb)   SpO2 98%   BMI 14.01 kg/m²   Constitutional:       Appearance: Well-developed and underweight. Frail. Chronically ill-appearing.   HENT: "      Head: Normocephalic and atraumatic.   Pulmonary:      Effort: Pulmonary effort is normal.      Breath sounds: Normal breath sounds.   Cardiovascular:      Normal rate. Regular rhythm.      Murmurs: There is no murmur.      No gallop. No click.   Edema:     Peripheral edema absent.   Skin:     General: Skin is warm and dry.   Neurological:      Mental Status: Alert and oriented to person, place, and time.       Procedures      ICD-10-CM ICD-9-CM   1. Coronary artery disease involving native coronary artery of native heart without angina pectoris  I25.10 414.01   2. Tobacco abuse  Z72.0 305.1   3. Dyslipidemia  E78.5 272.4   4. Essential hypertension  I10 401.9   5. Patient underweight  R63.6 783.22         Assessment/Plan:  1. Coronary artery disease: History of PCI to RCA 10/2015.  NSTEMI at [facility] on 7/20/2021 that was managed medically.  No significant anginal symptoms at this time. Continue aspirin, atorvastatin, Imdur, metoprolol, and nitro.     2.  Tobacco abuse: Rin Soler  reports that she has been smoking cigarettes. She started smoking about 46 years ago. She has a 30.00 pack-year smoking history. She has never used smokeless tobacco.. I have educated her on the risk of diseases from using tobacco products such as cancer, COPD and heart disease.  I advised her to quit and she is not willing to quit.  I spent 4 minutes counseling the patient.      3.  Dyslipidemia: Good control on lipid panel from 3/5/2021 on atorvastatin.     4.  Essential hypertension: Blood pressure is borderline today but improved compared to previous.  Continue losartan and metoprolol.     5.  Underweight: Body mass index is 14.01 kg/m².

## 2022-11-14 DIAGNOSIS — I25.119 CORONARY ARTERY DISEASE INVOLVING NATIVE CORONARY ARTERY OF NATIVE HEART WITH ANGINA PECTORIS: ICD-10-CM

## 2022-11-14 RX ORDER — ISOSORBIDE MONONITRATE 60 MG/1
60 TABLET, EXTENDED RELEASE ORAL DAILY
Qty: 30 TABLET | Refills: 11 | Status: SHIPPED | OUTPATIENT
Start: 2022-11-14

## 2022-12-28 NOTE — TELEPHONE ENCOUNTER
Caller: Edgarandryabraham Rin F    Relationship: Self    Best call back number: [phone]    Requested Prescriptions:   Requested Prescriptions     Pending Prescriptions Disp Refills   • metoprolol tartrate (LOPRESSOR) 25 MG tablet 60 tablet 11     Sig: Take 1 tablet by mouth 2 (Two) Times a Day.        Pharmacy where request should be sent: [id number] [address] [facility] [address]  [phone] [facility] [phone] FX     Additional details provided by patient: PT HAS TWO DAYS LEFT. SHE STATES THAT SHE TYPICALLY GETS A 90 DAY SUPPLY.    Does the patient have less than a 3 day supply:  [x] Yes  [] No    Would you like a call back once the refill request has been completed: [x] Yes [] No    If the office needs to give you a call back, can they leave a voicemail: [x] Yes [] No    Theodore Nielson Rep   12/28/22 15:49 CST

## 2023-05-18 ENCOUNTER — TELEPHONE (OUTPATIENT)
Dept: CARDIOLOGY | Facility: CLINIC | Age: 58
End: 2023-05-18
Payer: MEDICARE

## 2023-05-18 DIAGNOSIS — I25.119 CORONARY ARTERY DISEASE INVOLVING NATIVE CORONARY ARTERY OF NATIVE HEART WITH ANGINA PECTORIS: ICD-10-CM

## 2023-05-18 RX ORDER — NITROGLYCERIN 0.4 MG/1
0.4 TABLET SUBLINGUAL
Qty: 25 TABLET | Refills: 5 | Status: SHIPPED | OUTPATIENT
Start: 2023-05-18

## 2023-05-18 NOTE — TELEPHONE ENCOUNTER
Patient requested refill on NTG, rescheduled her missed f/u appt from 3/2023 to 5-31-23    Thank you

## 2023-05-18 NOTE — TELEPHONE ENCOUNTER
"Patient called office stating that she wanted to reschedule her missed follow up appt from 3/23. She stated shes been experiencing \"episodes\" that she feels may be small heart attacks. She experiences soa, chest pain, arm weakness, with exertion. Mainly when she gets up in the mornings.     She states this has been going on for 3-4 months and is \"gradually\" getting worse.    I made her a follow up appt for 5-31-23. Offered her an appt with NP here in [place] and patient declined stating she would wanted to see Dr Cunningham.      Advised her to present to ED if unrelieved symptoms.    Also requested refill on her NTG tablets. Sent rx request to provider.    Thanks   "

## 2023-05-26 RX ORDER — LOSARTAN POTASSIUM 50 MG/1
50 TABLET ORAL DAILY
Qty: 30 TABLET | Refills: 11 | Status: SHIPPED | OUTPATIENT
Start: 2023-05-26

## 2023-05-30 NOTE — PROGRESS NOTES
Reason for Visit: cardiovascular follow up.    HPI:  Rin Soler is a 58 y.o. female is here today for follow-up.  She reports doing about the same overall.  The cancer is stable and shrunk on last report.  She had an MRI of her brain to evaluate for metastasis but she does not know the results.  She has chest pain when she wakes up in the morning.  Once she takes the metoprolol, aspirin, and Imdur and the pain resolves.  She does not have any significant symptoms the rest of the day.  She has not had to take any nitroglycerin tablets.  She continues to smoke and is currently at about 2 PPD.  She spends most of the day at home and does not do much.  She does not eat much and her weight is down to 65 lbs.      Previous Cardiac Testing and Procedures:  - Stress echo (08/04/2015) negative for ischemia  - LHC (10/05/2015) 90% mid RCA stenosis status post PCI with 2.25 x 12 mm Xience Alpine IAN, no other coronary artery disease, EF 55% with mild inferior hypokinesis  - Stress echo (03/29/2016) low risk for ischemia, good exercise capacity  - Echo (12/22/2016) EF 60-65%, normal RV size and function, normal diastolic function, all valves structurally and functionally normal  - VESTA (12/22/2016) normal arterial exam of the lower extremities  - Holter monitor (12/22/2016) sinus rhythm with rare atrial ventricular ectopic beats, no significant pauses, arrhythmias, or events, symptoms associated with sinus tachycardia  - Exercise nuclear stress (3/13/2019) negative for ischemia, EF 78%  - CT chest (4/9/2021) 4.9 x 2.9 x 5.8 uh right upper lobe lung mass abutting the medial pleura with direct extension into the right hilum and suspected mediastinal invasion with abutment of the IVC and aortic arch and encasement of the right upper lobe pulmonary arteries and abutment of the right upper lobe bronchus, no definite metastatic disease of the chest, severe emphysema  - Echo (6/3/2021) EF 55-60%    Lab data:  - Lipid panel  (09/03/2015) 166/47/93/130  - Lipid panel (10/3/2017) total cholesterol 106, HDL 53, LDL 31, triglycerides 110  - Lipid panel (3/5/2021) total cholesterol 85, HDL 45, LDL 25, triglycerides 73  - Creatinine (4/12/2021) 0.7  - BMP (9/7/2021) creatinine 0.82, potassium 3.3, sodium 143-  - CBC (9/7/2021) WBC 5.5, hemoglobin 12.9, platelets 141    Patient Active Problem List   Diagnosis    Dysphagia    GERD (gastroesophageal reflux disease)    Tobacco abuse    Coronary artery disease involving native coronary artery of native heart with angina pectoris    Dyslipidemia    Essential hypertension    COPD (chronic obstructive pulmonary disease)    Lung cancer    Lung mass    Severe malnutrition    Postprocedural pneumothorax    Chronic pain syndrome    Chronic back pain    Pressure injury of coccygeal region, stage 2    Thrombocytopenia    Compression fracture of T8 vertebra with delayed healing    Patient underweight    Bone metastasis       Social History     Tobacco Use    Smoking status: Every Day     Packs/day: 1.50     Years: 20.00     Pack years: 30.00     Types: Cigarettes     Start date: 1976    Smokeless tobacco: Never   Vaping Use    Vaping Use: Never used   Substance Use Topics    Alcohol use: No    Drug use: No       Family History   Problem Relation Age of Onset    Diabetes Mother     Hypertension Mother     Cancer Father     Heart disease Father     Hearing loss Father     Hypertension Father        The following portions of the patient's history were reviewed and updated as appropriate: allergies, current medications, past family history, past medical history, past social history, past surgical history, and problem list.      Current Outpatient Medications:     aspirin 81 MG EC tablet, Take 1 tablet by mouth Daily., Disp: , Rfl:     atorvastatin (LIPITOR) 20 MG tablet, Take 1 tablet by mouth Daily., Disp: 30 tablet, Rfl: 11    diazePAM (VALIUM) 10 MG tablet, Take 1 tablet by mouth 3 (Three) Times a Day As  "Needed for Anxiety., Disp: , Rfl: 1    folic acid (FOLVITE) 1 MG tablet, Take 1 tablet by mouth Daily., Disp: , Rfl:     HYDROcodone-acetaminophen (NORCO)  MG per tablet, Take 1 tablet by mouth Every 4 (Four) Hours As Needed for Moderate Pain or Severe Pain. Every 4 to 6 hours prn, Disp: , Rfl: 0    isosorbide mononitrate (IMDUR) 60 MG 24 hr tablet, Take 1 tablet by mouth Daily., Disp: 30 tablet, Rfl: 11    losartan (Cozaar) 50 MG tablet, Take 1 tablet by mouth Daily., Disp: 30 tablet, Rfl: 11    metoprolol tartrate (LOPRESSOR) 25 MG tablet, Take 1 tablet by mouth 2 (Two) Times a Day., Disp: 60 tablet, Rfl: 11    nitroglycerin (Nitrostat) 0.4 MG SL tablet, Place 1 tablet under the tongue Every 5 (Five) Minutes As Needed for Chest Pain. Take no more than 3 doses in 15 minutes., Disp: 25 tablet, Rfl: 5    pantoprazole (PROTONIX) 40 MG EC tablet, Take 1 tablet by mouth Daily., Disp: , Rfl: 11    potassium chloride (K-DUR,KLOR-CON) 10 MEQ CR tablet, Take 10 mEq by mouth Daily. (Patient not taking: Reported on 5/31/2023), Disp: , Rfl:     Review of Systems   Constitutional: Negative for chills and fever.   Cardiovascular:  Positive for chest pain and dyspnea on exertion. Negative for paroxysmal nocturnal dyspnea.   Respiratory:  Positive for shortness of breath. Negative for cough.    Skin:  Negative for rash.   Gastrointestinal:  Negative for abdominal pain and heartburn.   Neurological:  Negative for dizziness and numbness.     Objective   /82 (BP Location: Left arm, Patient Position: Sitting, Cuff Size: Adult)   Pulse 67   Ht 147.3 cm (57.99\")   Wt 29.5 kg (65 lb)   SpO2 98%   BMI 13.59 kg/m²   Constitutional:       Appearance: Underweight. Frail. Chronically ill-appearing.   HENT:      Head: Normocephalic and atraumatic.   Pulmonary:      Effort: Pulmonary effort is normal.      Breath sounds: Normal breath sounds.   Cardiovascular:      Normal rate. Regular rhythm.      Murmurs: There is no murmur. "      No gallop.    Edema:     Peripheral edema absent.   Skin:     General: Skin is warm and dry.   Neurological:      Mental Status: Alert and oriented to person, place, and time.       ECG 12 Lead    Date/Time: 5/31/2023 10:30 AM  Performed by: Mike Cunningham MD  Authorized by: Mike Cunningham MD   Comparison: compared with previous ECG from 5/12/2022  Similar to previous ECG  Rhythm: sinus rhythm  Rate: normal  QRS axis: right          ICD-10-CM ICD-9-CM   1. Coronary artery disease involving native coronary artery of native heart with angina pectoris  I25.119 414.01     413.9   2. Tobacco abuse  Z72.0 305.1   3. Dyslipidemia  E78.5 272.4   4. Essential hypertension  I10 401.9   5. Underweight  R63.6 783.22         Assessment/Plan:  1.   Coronary artery disease: History of PCI to RCA 10/2015.  NSTEMI at [facility] 7/2021 that was managed medically.  Somewhat atypical chest pain symptoms.  Continue aspirin, atorvastatin, Imdur, metoprolol, and nitroglycerin.      2.  Tobacco abuse: Rin Soler  reports that she has been smoking cigarettes. She started smoking about 47 years ago. She has a 30.00 pack-year smoking history. She has never used smokeless tobacco.. I have educated her on the risk of diseases from using tobacco products such as cancer, COPD, and heart disease.  I advised her to quit and she is not willing to quit.  I spent 4 minutes counseling the patient.     3.  Dyslipidemia: Continue atorvastatin and check a lipid panel.      4.  Essential hypertension: Blood pressure is acceptable.  Continue losartan and metoprolol.     5.  Underweight: Body mass index is 13.59 kg/m².

## 2023-05-31 ENCOUNTER — OFFICE VISIT (OUTPATIENT)
Dept: CARDIOLOGY | Facility: CLINIC | Age: 58
End: 2023-05-31

## 2023-05-31 VITALS
BODY MASS INDEX: 13.64 KG/M2 | HEIGHT: 58 IN | HEART RATE: 67 BPM | OXYGEN SATURATION: 98 % | WEIGHT: 65 LBS | DIASTOLIC BLOOD PRESSURE: 82 MMHG | SYSTOLIC BLOOD PRESSURE: 130 MMHG

## 2023-05-31 DIAGNOSIS — I10 ESSENTIAL HYPERTENSION: ICD-10-CM

## 2023-05-31 DIAGNOSIS — R63.6 UNDERWEIGHT: ICD-10-CM

## 2023-05-31 DIAGNOSIS — I25.119 CORONARY ARTERY DISEASE INVOLVING NATIVE CORONARY ARTERY OF NATIVE HEART WITH ANGINA PECTORIS: Primary | ICD-10-CM

## 2023-05-31 DIAGNOSIS — E78.5 DYSLIPIDEMIA: ICD-10-CM

## 2023-05-31 DIAGNOSIS — Z72.0 TOBACCO ABUSE: ICD-10-CM

## 2023-05-31 PROBLEM — F17.200 SMOKER: Status: RESOLVED | Noted: 2021-10-14 | Resolved: 2023-05-31

## 2023-05-31 NOTE — LETTER
May 31, 2023     Milton Mcfadden MD  [address]  [address]  [place] [address]    Patient: Rin Soler   YOB: 1965   Date of Visit: 5/31/2023       Dear Milton Mcfadden MD    Rin Soler was in my office today. Below is a copy of my note.    If you have questions, please do not hesitate to call me. I look forward to following Rin along with you.         Sincerely,        Mike Cunningham MD        CC: No Recipients      Reason for Visit: cardiovascular follow up.    HPI:  Rin Soler is a 58 y.o. female is here today for follow-up.  She reports doing about the same overall.  The cancer is stable and shrunk on last report.  She had an MRI of her brain to evaluate for metastasis but she does not know the results.  She has chest pain when she wakes up in the morning.  Once she takes the metoprolol, aspirin, and Imdur and the pain resolves.  She does not have any significant symptoms the rest of the day.  She has not had to take any nitroglycerin tablets.  She continues to smoke and is currently at about 2 PPD.  She spends most of the day at home and does not do much.  She does not eat much and her weight is down to 65 lbs.      Previous Cardiac Testing and Procedures:  - Stress echo (08/04/2015) negative for ischemia  - [facility] (10/05/2015) 90% mid RCA stenosis status post PCI with 2.25 x 12 mm Xience Alpine IAN, no other coronary artery disease, EF 55% with mild inferior hypokinesis  - Stress echo (03/29/2016) low risk for ischemia, good exercise capacity  - Echo (12/22/2016) EF 60-65%, normal RV size and function, normal diastolic function, all valves structurally and functionally normal  - [facility]STA (12/22/2016) normal arterial exam of the lower extremities  - Holter monitor (12/22/2016) sinus rhythm with rare atrial ventricular ectopic beats, no significant pauses, arrhythmias, or events, symptoms associated with sinus tachycardia  - Exercise nuclear stress (3/13/2019) negative for ischemia, EF  78%  - CT chest (4/9/2021) 4.9 x 2.9 x 5.8 uh right upper lobe lung mass abutting the medial pleura with direct extension into the right hilum and suspected mediastinal invasion with abutment of the IVC and aortic arch and encasement of the right upper lobe pulmonary arteries and abutment of the right upper lobe bronchus, no definite metastatic disease of the chest, severe emphysema  - Echo (6/3/2021) EF 55-60%    Lab data:  - Lipid panel (09/03/2015) 166/47/93/130  - Lipid panel (10/3/2017) total cholesterol 106, HDL 53, LDL 31, triglycerides 110  - Lipid panel (3/5/2021) total cholesterol 85, HDL 45, LDL 25, triglycerides 73  - Creatinine (4/12/2021) 0.7  - BMP (9/7/2021) creatinine 0.82, potassium 3.3, sodium 143-  - CBC (9/7/2021) WBC 5.5, hemoglobin 12.9, platelets 141    Patient Active Problem List   Diagnosis   • Dysphagia   • GERD (gastroesophageal reflux disease)   • Tobacco abuse   • Coronary artery disease involving native coronary artery of native heart with angina pectoris   • Dyslipidemia   • Essential hypertension   • COPD (chronic obstructive pulmonary disease)   • Lung cancer   • Lung mass   • Severe malnutrition   • Postprocedural pneumothorax   • Chronic pain syndrome   • Chronic back pain   • Pressure injury of coccygeal region, stage 2   • Thrombocytopenia   • Compression fracture of T8 vertebra with delayed healing   • Patient underweight   • Bone metastasis       Social History     Tobacco Use   • Smoking status: Every Day     Packs/day: 1.50     Years: 20.00     Pack years: 30.00     Types: Cigarettes     Start date: 1976   • Smokeless tobacco: Never   Vaping Use   • Vaping Use: Never used   Substance Use Topics   • Alcohol use: No   • Drug use: No       Family History   Problem Relation Age of Onset   • Diabetes Mother    • Hypertension Mother    • Cancer Father    • Heart disease Father    • Hearing loss Father    • Hypertension Father        The following portions of the patient's history  were reviewed and updated as appropriate: allergies, current medications, past family history, past medical history, past social history, past surgical history, and problem list.      Current Outpatient Medications:   •  aspirin 81 MG EC tablet, Take 1 tablet by mouth Daily., Disp: , Rfl:   •  atorvastatin (LIPITOR) 20 MG tablet, Take 1 tablet by mouth Daily., Disp: 30 tablet, Rfl: 11  •  diazePAM (VALIUM) 10 MG tablet, Take 1 tablet by mouth 3 (Three) Times a Day As Needed for Anxiety., Disp: , Rfl: 1  •  folic acid (FOLVITE) 1 MG tablet, Take 1 tablet by mouth Daily., Disp: , Rfl:   •  HYDROcodone-acetaminophen (NORCO)  MG per tablet, Take 1 tablet by mouth Every 4 (Four) Hours As Needed for Moderate Pain or Severe Pain. Every 4 to 6 hours prn, Disp: , Rfl: 0  •  isosorbide mononitrate (IMDUR) 60 MG 24 hr tablet, Take 1 tablet by mouth Daily., Disp: 30 tablet, Rfl: 11  •  losartan (Cozaar) 50 MG tablet, Take 1 tablet by mouth Daily., Disp: 30 tablet, Rfl: 11  •  metoprolol tartrate (LOPRESSOR) 25 MG tablet, Take 1 tablet by mouth 2 (Two) Times a Day., Disp: 60 tablet, Rfl: 11  •  nitroglycerin (Nitrostat) 0.4 MG SL tablet, Place 1 tablet under the tongue Every 5 (Five) Minutes As Needed for Chest Pain. Take no more than 3 doses in 15 minutes., Disp: 25 tablet, Rfl: 5  •  pantoprazole (PROTONIX) 40 MG EC tablet, Take 1 tablet by mouth Daily., Disp: , Rfl: 11  •  potassium chloride (K-DUR,KLOR-CON) 10 MEQ CR tablet, Take 10 mEq by mouth Daily. (Patient not taking: Reported on 5/31/2023), Disp: , Rfl:     Review of Systems   Constitutional: Negative for chills and fever.   Cardiovascular:  Positive for chest pain and dyspnea on exertion. Negative for paroxysmal nocturnal dyspnea.   Respiratory:  Positive for shortness of breath. Negative for cough.    Skin:  Negative for rash.   Gastrointestinal:  Negative for abdominal pain and heartburn.   Neurological:  Negative for dizziness and numbness.     Objective  BP  "130/82 (BP Location: Left arm, Patient Position: Sitting, Cuff Size: Adult)   Pulse 67   Ht 147.3 cm (57.99\")   Wt 29.5 kg (65 lb)   SpO2 98%   BMI 13.59 kg/m²   Constitutional:       Appearance: Underweight. Frail. Chronically ill-appearing.   HENT:      Head: Normocephalic and atraumatic.   Pulmonary:      Effort: Pulmonary effort is normal.      Breath sounds: Normal breath sounds.   Cardiovascular:      Normal rate. Regular rhythm.      Murmurs: There is no murmur.      No gallop.    Edema:     Peripheral edema absent.   Skin:     General: Skin is warm and dry.   Neurological:      Mental Status: Alert and oriented to person, place, and time.       ECG 12 Lead    Date/Time: 5/31/2023 10:30 AM  Performed by: Mike Cunningham MD  Authorized by: Mike Cunningham MD   Comparison: compared with previous ECG from 5/12/2022  Similar to previous ECG  Rhythm: sinus rhythm  Rate: normal  QRS axis: right          ICD-10-CM ICD-9-CM   1. Coronary artery disease involving native coronary artery of native heart with angina pectoris  I25.119 414.01     413.9   2. Tobacco abuse  Z72.0 305.1   3. Dyslipidemia  E78.5 272.4   4. Essential hypertension  I10 401.9   5. Underweight  R63.6 783.22         Assessment/Plan:  1.   Coronary artery disease: History of PCI to RCA 10/2015.  NSTEMI at [facility] 7/2021 that was managed medically.  Somewhat atypical chest pain symptoms.  Continue aspirin, atorvastatin, Imdur, metoprolol, and nitroglycerin.      2.  Tobacco abuse: Rin Soler  reports that she has been smoking cigarettes. She started smoking about 47 years ago. She has a 30.00 pack-year smoking history. She has never used smokeless tobacco.. I have educated her on the risk of diseases from using tobacco products such as cancer, COPD, and heart disease.  I advised her to quit and she is not willing to quit.  I spent 4 minutes counseling the patient.     3.  Dyslipidemia: Continue atorvastatin and check a lipid panel.      4.  " Essential hypertension: Blood pressure is acceptable.  Continue losartan and metoprolol.     5.  Underweight: Body mass index is 13.59 kg/m².

## 2023-08-23 ENCOUNTER — TELEPHONE (OUTPATIENT)
Dept: CARDIOLOGY | Facility: CLINIC | Age: 58
End: 2023-08-23
Payer: MEDICARE

## 2023-08-23 RX ORDER — LOSARTAN POTASSIUM 100 MG/1
100 TABLET ORAL DAILY
Qty: 30 TABLET | Refills: 11 | Status: SHIPPED | OUTPATIENT
Start: 2023-08-23

## 2023-08-23 NOTE — TELEPHONE ENCOUNTER
Call returned to pt. She has been having elevated bp since we saw her last although she did not log them She has been under a lot of stress and pain. She still doing IV concerta for cancer. She still smokes. She takes metoprolol 25 bid and losartan 50 mg at bedtime. I have advised her to keep bp log for a week. She is to call us with readings. Please adivse

## 2023-08-23 NOTE — TELEPHONE ENCOUNTER
Caller: VENESSA (ONCOLOGY) DR. MOROCHO     Relationship to patient: Provider    Best call back number: [phone]     Chief complaint: PATIENT IS EXPERIENCING HIGH BLOOD PRESSURE (166/112), HER ONCOLOGIST IS WANTING HER TO BE SEEN BEFORE NOVEMBER TO TRY TO CONTROL HER BLOOD PRESSURE. VENESSA STATES THE MEDICATION HAS NOT BEEN WORKING FOR THE PATIENT    Type of visit: FOLLOW UP    Requested date: ASAP    If rescheduling, when is the original appointment: 11.29.23    Additional notes: PLEASE CALL PATIENT FOR SCHEDULING

## 2023-10-18 DIAGNOSIS — I25.119 CORONARY ARTERY DISEASE INVOLVING NATIVE CORONARY ARTERY OF NATIVE HEART WITH ANGINA PECTORIS: ICD-10-CM

## 2023-10-18 RX ORDER — ISOSORBIDE MONONITRATE 60 MG/1
60 TABLET, EXTENDED RELEASE ORAL DAILY
Qty: 30 TABLET | Refills: 11 | Status: SHIPPED | OUTPATIENT
Start: 2023-10-18

## 2024-05-28 ENCOUNTER — TELEPHONE (OUTPATIENT)
Dept: CARDIOLOGY | Facility: CLINIC | Age: 59
End: 2024-05-28
Payer: MEDICARE

## 2024-05-28 NOTE — TELEPHONE ENCOUNTER
Call returned to pt. Her bp have been 119/77, 114/83, 110/78, 121/80 with heart rates from 109 to 112 bpm. She has held her bp meds for 3 days now. She has lots of back pain and has fibromyalgia. Appt made to see Dr Conner tomorrow in [place]

## 2024-05-28 NOTE — TELEPHONE ENCOUNTER
Caller: Rin Soler     Relationship: PATIENT     Best call back number: [phone]    What is your medical concern? BLOOD PRESSURE IS RUNNING LOWER THAN NORMAL AND HEART RATE IS HIGHER THAN NORMAL.PATIENT IS ALSO COLD A LOT AND MAINLY COLD IN LEGS. PATIENT ALSO STATES THAT SHE IS OFF BALANCE WHEN SHE STANDS .     How long has this issue been going on? BEING COLD-COUPLE OF YEARS   LOW BLOOD PRESSURE- COUPLE MONTHS     Is your provider already aware of this issue? YES     Have you been treated for this issue? YES

## 2024-05-29 ENCOUNTER — OFFICE VISIT (OUTPATIENT)
Dept: CARDIOLOGY | Facility: CLINIC | Age: 59
End: 2024-05-29
Payer: MEDICARE

## 2024-05-29 VITALS
BODY MASS INDEX: 13.43 KG/M2 | OXYGEN SATURATION: 98 % | WEIGHT: 64 LBS | HEIGHT: 58 IN | DIASTOLIC BLOOD PRESSURE: 76 MMHG | HEART RATE: 82 BPM | SYSTOLIC BLOOD PRESSURE: 108 MMHG

## 2024-05-29 DIAGNOSIS — I10 ESSENTIAL HYPERTENSION: ICD-10-CM

## 2024-05-29 DIAGNOSIS — R63.6 PATIENT UNDERWEIGHT: ICD-10-CM

## 2024-05-29 DIAGNOSIS — E78.5 DYSLIPIDEMIA: ICD-10-CM

## 2024-05-29 DIAGNOSIS — I25.118 CORONARY ARTERY DISEASE OF NATIVE ARTERY OF NATIVE HEART WITH STABLE ANGINA PECTORIS: Primary | ICD-10-CM

## 2024-05-29 DIAGNOSIS — Z72.0 TOBACCO ABUSE: ICD-10-CM

## 2024-05-29 RX ORDER — METOPROLOL SUCCINATE 25 MG/1
12.5 TABLET, EXTENDED RELEASE ORAL DAILY
Qty: 30 TABLET | Refills: 11 | Status: SHIPPED | OUTPATIENT
Start: 2024-05-29

## 2024-05-29 NOTE — LETTER
May 29, 2024     Milton Mcfadden MD  [address]  [address]  [facility] [address]    Patient: Rin Soler   YOB: 1965   Date of Visit: 5/29/2024       Dear Milton Mcfadden MD    Rin Soler was in my office today. Below is a copy of my note.    If you have questions, please do not hesitate to call me. I look forward to following Rin along with you.         Sincerely,        Mike Cunningham MD        CC: No Recipients      Reason for Visit: cardiovascular follow up.    HPI:  Rin Soler is a 59 y.o. female is here today for follow-up.  Her blood pressure has been running low recently.  She is also noted some rapid heartbeats and tachycardia.  She is no longer taking metoprolol and losartan.  She is in normal sinus rhythm on EKG today.  Her weight remains low with a BMI of 13.4 kg/m².  Her weight is down 1 pound compared to last visit a year ago.    Previous Cardiac Testing and Procedures:  - Stress echo (08/04/2015) negative for ischemia  - [facility] (10/05/2015) 90% mid RCA stenosis status post PCI with 2.25 x 12 mm Xience Alpine IAN, no other coronary artery disease, EF 55% with mild inferior hypokinesis  - Stress echo (03/29/2016) low risk for ischemia, good exercise capacity  - Echo (12/22/2016) EF 60-65%, normal RV size and function, normal diastolic function, all valves structurally and functionally normal  - VESTA (12/22/2016) normal arterial exam of the lower extremities  - Holter monitor (12/22/2016) sinus rhythm with rare atrial ventricular ectopic beats, no significant pauses, arrhythmias, or events, symptoms associated with sinus tachycardia  - Exercise nuclear stress (3/13/2019) negative for ischemia, EF 78%  - CT chest (4/9/2021) 4.9 x 2.9 x 5.8 uh right upper lobe lung mass abutting the medial pleura with direct extension into the right hilum and suspected mediastinal invasion with abutment of the IVC and aortic arch and encasement of the right upper lobe pulmonary arteries and  abutment of the right upper lobe bronchus, no definite metastatic disease of the chest, severe emphysema  - Echo (6/3/2021) EF 55-60%     Lab data:  - Lipid panel (09/03/2015) 166/47/93/130  - Lipid panel (10/3/2017) total cholesterol 106, HDL 53, LDL 31, triglycerides 110  - Lipid panel (3/5/2021) total cholesterol 85, HDL 45, LDL 25, triglycerides 73  - Creatinine (4/12/2021) 0.7  - BMP (9/7/2021) creatinine 0.82, potassium 3.3, sodium 143-  - CBC (9/7/2021) WBC 5.5, hemoglobin 12.9, platelets 141    Patient Active Problem List   Diagnosis   • Dysphagia   • GERD (gastroesophageal reflux disease)   • Tobacco abuse   • Coronary artery disease involving native coronary artery of native heart with angina pectoris   • Dyslipidemia   • Essential hypertension   • COPD (chronic obstructive pulmonary disease)   • Lung cancer   • Lung mass   • Severe malnutrition   • Postprocedural pneumothorax   • Chronic pain syndrome   • Chronic back pain   • Pressure injury of coccygeal region, stage 2   • Thrombocytopenia   • Compression fracture of T8 vertebra with delayed healing   • Patient underweight   • Bone metastasis       Social History     Tobacco Use   • Smoking status: Every Day     Current packs/day: 1.50     Average packs/day: 1.5 packs/day for 48.4 years (72.6 ttl pk-yrs)     Types: Cigarettes     Start date: 1976   • Smokeless tobacco: Never   Vaping Use   • Vaping status: Never Used   Substance Use Topics   • Alcohol use: No   • Drug use: No       Family History   Problem Relation Age of Onset   • Diabetes Mother    • Hypertension Mother    • Cancer Father    • Heart disease Father    • Hearing loss Father    • Hypertension Father        The following portions of the patient's history were reviewed and updated as appropriate: allergies, current medications, past family history, past medical history, past social history, past surgical history, and problem list.      Current Outpatient Medications:   •  aspirin 81 MG EC  "tablet, Take 1 tablet by mouth Daily., Disp: , Rfl:   •  atorvastatin (LIPITOR) 20 MG tablet, Take 1 tablet by mouth Daily., Disp: 30 tablet, Rfl: 11  •  diazePAM (VALIUM) 10 MG tablet, Take 1 tablet by mouth 3 (Three) Times a Day As Needed for Anxiety., Disp: , Rfl: 1  •  folic acid (FOLVITE) 1 MG tablet, Take 1 tablet by mouth Daily., Disp: , Rfl:   •  HYDROcodone-acetaminophen (NORCO)  MG per tablet, Take 1 tablet by mouth Every 4 (Four) Hours As Needed for Moderate Pain or Severe Pain. Every 4 to 6 hours prn, Disp: , Rfl: 0  •  isosorbide mononitrate (IMDUR) 60 MG 24 hr tablet, TAKE 1 TABLET BY MOUTH DAILY, Disp: 30 tablet, Rfl: 11  •  pantoprazole (PROTONIX) 40 MG EC tablet, Take 1 tablet by mouth Daily., Disp: , Rfl: 11  •  losartan (Cozaar) 100 MG tablet, Take 1 tablet by mouth Daily. (Patient not taking: Reported on 5/29/2024), Disp: 30 tablet, Rfl: 11  •  metoprolol tartrate (LOPRESSOR) 25 MG tablet, TAKE 1 TABLET BY MOUTH TWICE DAILY (Patient not taking: Reported on 5/29/2024), Disp: 60 tablet, Rfl: 11  •  nitroglycerin (Nitrostat) 0.4 MG SL tablet, Place 1 tablet under the tongue Every 5 (Five) Minutes As Needed for Chest Pain. Take no more than 3 doses in 15 minutes. (Patient not taking: Reported on 5/29/2024), Disp: 25 tablet, Rfl: 5    ROS    Objective  /76 (BP Location: Left arm, Patient Position: Sitting, Cuff Size: Adult)   Pulse 82   Ht 147.3 cm (57.99\")   Wt 29 kg (64 lb)   SpO2 98%   BMI 13.38 kg/m²   Physical Exam    ECG 12 Lead    Date/Time: 5/29/2024 2:15 PM  Performed by: Mike Cunningham MD    Authorized by: Mike Cunningham MD  Comparison: compared with previous ECG from 5/31/2023  Comparison to previous ECG: Inferior Q waves are now present  Rhythm: sinus rhythm  Rate: normal  Q waves: III and aVF    Other findings: left ventricular hypertrophy          No diagnosis found.      Assessment/Plan:  1.  Coronary artery disease: History of PCI to RCA 10/2015.  NSTEMI at [facility] " 7/2021 that was managed medically.  Somewhat atypical chest pain symptoms.  Continue aspirin, atorvastatin, Imdur, metoprolol, and nitroglycerin.      2.  Tobacco abuse: Rin Soler  reports that she has been smoking cigarettes. She started smoking about 47 years ago. She has a 30.00 pack-year smoking history. She has never used smokeless tobacco.. I have educated her on the risk of diseases from using tobacco products such as cancer, COPD, and heart disease.  I advised her to quit and she is not willing to quit.  I spent 4 minutes counseling the patient.     3.  Dyslipidemia: Continue atorvastatin and check a lipid panel.      4.  Essential hypertension: Blood pressure is low normal today.     5.  Underweight: Body mass index is 13.38 kg/m².  Weight is down 1 pound compared to a year ago.

## 2024-05-29 NOTE — PROGRESS NOTES
Reason for Visit: cardiovascular follow up.    HPI:  Rin Soler is a 59 y.o. female is here today for follow-up.  Her blood pressure has been running low recently.  She is also noted some rapid heartbeats and tachycardia.  She is no longer taking metoprolol and losartan.   She is in normal sinus rhythm on EKG today.  Her weight remains low with a BMI of 13.4 kg/m².  Her weight is down 1 pound compared to last visit a year ago.    Her heart rate at home is running from the 90's up to 137.  She took a metoprolol this morning, which seemed to help.  She has not been eating or drinking well.  Her stomach has been bothering her and her malnutrition is worsening.  She was vomiting a few months ago, but this has improved.  She stopped her cancer treatments.  The main tumor is stable and she reports there is another spot they are watching.  She spends most of the day sitting in her recliner and does very little activity.  She continue to smoke and is not ready to quit.      Previous Cardiac Testing and Procedures:  - Stress echo (08/04/2015) negative for ischemia  - [facility] (10/05/2015) 90% mid RCA stenosis status post PCI with 2.25 x 12 mm Xience Alpine IAN, no other coronary artery disease, EF 55% with mild inferior hypokinesis  - Stress echo (03/29/2016) low risk for ischemia, good exercise capacity  - Echo (12/22/2016) EF 60-65%, normal RV size and function, normal diastolic function, all valves structurally and functionally normal  - [facility] (12/22/2016) normal arterial exam of the lower extremities  - Holter monitor (12/22/2016) sinus rhythm with rare atrial ventricular ectopic beats, no significant pauses, arrhythmias, or events, symptoms associated with sinus tachycardia  - Exercise nuclear stress (3/13/2019) negative for ischemia, EF 78%  - CT chest (4/9/2021) 4.9 x 2.9 x 5.8 uh right upper lobe lung mass abutting the medial pleura with direct extension into the right hilum and suspected mediastinal invasion with  abutment of the IVC and aortic arch and encasement of the right upper lobe pulmonary arteries and abutment of the right upper lobe bronchus, no definite metastatic disease of the chest, severe emphysema  - Echo (6/3/2021) EF 55-60%     Lab data:  - Lipid panel (09/03/2015) 166/47/93/130  - Lipid panel (10/3/2017) total cholesterol 106, HDL 53, LDL 31, triglycerides 110  - Lipid panel (3/5/2021) total cholesterol 85, HDL 45, LDL 25, triglycerides 73  - Creatinine (4/12/2021) 0.7  - BMP (9/7/2021) creatinine 0.82, potassium 3.3, sodium 143-  - CBC (9/7/2021) WBC 5.5, hemoglobin 12.9, platelets 141    Patient Active Problem List   Diagnosis    Dysphagia    GERD (gastroesophageal reflux disease)    Tobacco abuse    Coronary artery disease of native artery of native heart with stable angina pectoris    Dyslipidemia    Essential hypertension    COPD (chronic obstructive pulmonary disease)    Lung cancer    Lung mass    Severe malnutrition    Postprocedural pneumothorax    Chronic pain syndrome    Chronic back pain    Pressure injury of coccygeal region, stage 2    Thrombocytopenia    Compression fracture of T8 vertebra with delayed healing    Patient underweight    Bone metastasis       Social History     Tobacco Use    Smoking status: Every Day     Current packs/day: 1.50     Average packs/day: 1.5 packs/day for 48.4 years (72.6 ttl pk-yrs)     Types: Cigarettes     Start date: 1976    Smokeless tobacco: Never   Vaping Use    Vaping status: Never Used   Substance Use Topics    Alcohol use: No    Drug use: No       Family History   Problem Relation Age of Onset    Diabetes Mother     Hypertension Mother     Cancer Father     Heart disease Father     Hearing loss Father     Hypertension Father        The following portions of the patient's history were reviewed and updated as appropriate: allergies, current medications, past family history, past medical history, past social history, past surgical history, and problem  "list.      Current Outpatient Medications:     aspirin 81 MG EC tablet, Take 1 tablet by mouth Daily., Disp: , Rfl:     atorvastatin (LIPITOR) 20 MG tablet, Take 1 tablet by mouth Daily., Disp: 30 tablet, Rfl: 11    diazePAM (VALIUM) 10 MG tablet, Take 1 tablet by mouth 3 (Three) Times a Day As Needed for Anxiety., Disp: , Rfl: 1    folic acid (FOLVITE) 1 MG tablet, Take 1 tablet by mouth Daily., Disp: , Rfl:     HYDROcodone-acetaminophen (NORCO)  MG per tablet, Take 1 tablet by mouth Every 4 (Four) Hours As Needed for Moderate Pain or Severe Pain. Every 4 to 6 hours prn, Disp: , Rfl: 0    isosorbide mononitrate (IMDUR) 60 MG 24 hr tablet, TAKE 1 TABLET BY MOUTH DAILY, Disp: 30 tablet, Rfl: 11    pantoprazole (PROTONIX) 40 MG EC tablet, Take 1 tablet by mouth Daily., Disp: , Rfl: 11    metoprolol succinate XL (TOPROL-XL) 25 MG 24 hr tablet, Take 0.5 tablets by mouth Daily., Disp: 30 tablet, Rfl: 11    nitroglycerin (Nitrostat) 0.4 MG SL tablet, Place 1 tablet under the tongue Every 5 (Five) Minutes As Needed for Chest Pain. Take no more than 3 doses in 15 minutes. (Patient not taking: Reported on 5/29/2024), Disp: 25 tablet, Rfl: 5    Review of Systems   Constitutional: Positive for malaise/fatigue and weight loss. Negative for chills and fever.   Cardiovascular:  Negative for chest pain and paroxysmal nocturnal dyspnea.   Respiratory:  Negative for cough and shortness of breath.    Endocrine: Positive for cold intolerance.   Skin:  Negative for rash.   Musculoskeletal:  Positive for muscle weakness.   Gastrointestinal:  Positive for abdominal pain, heartburn, nausea and vomiting.   Neurological:  Positive for weakness. Negative for dizziness and numbness.       Objective   /76 (BP Location: Left arm, Patient Position: Sitting, Cuff Size: Adult)   Pulse 82   Ht 147.3 cm (57.99\")   Wt 29 kg (64 lb)   SpO2 98%   BMI 13.38 kg/m²   Constitutional:       Appearance: Underweight. Frail. Chronically " ill-appearing.   HENT:      Head: Normocephalic and atraumatic.   Pulmonary:      Effort: Pulmonary effort is normal.      Breath sounds: Normal breath sounds.   Cardiovascular:      Normal rate. Regular rhythm.   Edema:     Peripheral edema absent.   Skin:     General: Skin is warm and dry.   Neurological:      Mental Status: Alert and oriented to person, place, and time.         ECG 12 Lead    Date/Time: 5/29/2024 2:15 PM  Performed by: Mike Cunningham MD    Authorized by: Mike Cunningham MD  Comparison: compared with previous ECG from 5/31/2023  Comparison to previous ECG: Inferior Q waves are now present  Rhythm: sinus rhythm  Rate: normal  Q waves: III and aVF    Other findings: left ventricular hypertrophy            ICD-10-CM ICD-9-CM   1. Coronary artery disease of native artery of native heart with stable angina pectoris  I25.118 414.01     413.9   2. Tobacco abuse  Z72.0 305.1   3. Essential hypertension  I10 401.9   4. Dyslipidemia  E78.5 272.4   5. Patient underweight  R63.6 783.22         Assessment/Plan:  1.  Coronary artery disease: History of PCI to RCA 10/2015.  NSTEMI at [facility] 7/2021, managed medically.  Anginal symptoms are stable.  Continue aspirin, atorvastatin, Imdur, metoprolol, and nitroglycerin.      2.  Tobacco abuse: Rin Soler  reports that she has been smoking cigarettes. She started smoking about 48 years ago. She has a 72.6 pack-year smoking history. She has never used smokeless tobacco. I have educated her on the risk of diseases from using tobacco products such as cancer, COPD, and heart disease.  I advised her to quit and she is not willing to quit.  I spent 4 minutes counseling the patient.     3.  Dyslipidemia: Continue atorvastatin.      4.  Essential hypertension: Blood pressure is acceptable today but running low at home.  Discontinue losartan and reduce dose of metoprolol to 12.5 mg and change to extended release.      5.  Underweight: Body mass index is 13.38 kg/m².   Weight is down 1 pound compared to a year ago.   on the importance of good nutrition.

## 2024-10-31 DIAGNOSIS — I25.119 CORONARY ARTERY DISEASE INVOLVING NATIVE CORONARY ARTERY OF NATIVE HEART WITH ANGINA PECTORIS: ICD-10-CM

## 2024-11-01 RX ORDER — ISOSORBIDE MONONITRATE 60 MG/1
60 TABLET, EXTENDED RELEASE ORAL DAILY
Qty: 30 TABLET | Refills: 0 | Status: SHIPPED | OUTPATIENT
Start: 2024-11-01

## 2024-11-26 ENCOUNTER — TELEPHONE (OUTPATIENT)
Dept: CARDIOLOGY | Facility: CLINIC | Age: 59
End: 2024-11-26
Payer: MEDICARE

## 2024-11-26 NOTE — TELEPHONE ENCOUNTER
Pt called today asking for medication refills.  She is wanting to go back to regular metoprolol instead of taking the extended-release.  She also needs her isosorbide refilled and uses [facility] pharmacy in [place].  I explained to her that she would need an appointment but she stated that she has been sick with bronchitis and is having a hard time getting over it and cannot schedule an appointment.    Please advise.

## 2024-11-27 DIAGNOSIS — I25.119 CORONARY ARTERY DISEASE INVOLVING NATIVE CORONARY ARTERY OF NATIVE HEART WITH ANGINA PECTORIS: ICD-10-CM

## 2024-11-27 RX ORDER — ISOSORBIDE MONONITRATE 60 MG/1
60 TABLET, EXTENDED RELEASE ORAL DAILY
Qty: 30 TABLET | Refills: 11 | Status: SHIPPED | OUTPATIENT
Start: 2024-11-27

## 2024-11-27 RX ORDER — METOPROLOL TARTRATE 25 MG/1
12.5 TABLET, FILM COATED ORAL 2 TIMES DAILY
Qty: 30 TABLET | Refills: 11 | Status: SHIPPED | OUTPATIENT
Start: 2024-11-27